# Patient Record
Sex: MALE | Race: WHITE | NOT HISPANIC OR LATINO | Employment: OTHER | URBAN - METROPOLITAN AREA
[De-identification: names, ages, dates, MRNs, and addresses within clinical notes are randomized per-mention and may not be internally consistent; named-entity substitution may affect disease eponyms.]

---

## 2017-11-27 ENCOUNTER — OFFICE VISIT (OUTPATIENT)
Dept: URGENT CARE | Facility: PHYSICIAN GROUP | Age: 82
End: 2017-11-27
Payer: MEDICARE

## 2017-11-27 ENCOUNTER — HOSPITAL ENCOUNTER (OUTPATIENT)
Dept: RADIOLOGY | Facility: MEDICAL CENTER | Age: 82
End: 2017-11-27
Attending: PHYSICIAN ASSISTANT
Payer: MEDICARE

## 2017-11-27 VITALS
WEIGHT: 165 LBS | OXYGEN SATURATION: 97 % | BODY MASS INDEX: 21.77 KG/M2 | RESPIRATION RATE: 16 BRPM | DIASTOLIC BLOOD PRESSURE: 80 MMHG | HEART RATE: 80 BPM | TEMPERATURE: 99.2 F | SYSTOLIC BLOOD PRESSURE: 122 MMHG
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
## 2017-11-27 DIAGNOSIS — S82.842A BIMALLEOLAR FRACTURE OF LEFT ANKLE, CLOSED, INITIAL ENCOUNTER: Primary | ICD-10-CM

## 2017-11-27 DIAGNOSIS — S99.912A INJURY OF LEFT ANKLE, INITIAL ENCOUNTER: ICD-10-CM

## 2017-11-27 DIAGNOSIS — L03.039 INFECTION OF TOENAIL: ICD-10-CM

## 2017-11-27 PROCEDURE — 99204 OFFICE O/P NEW MOD 45 MIN: CPT | Performed by: PHYSICIAN ASSISTANT

## 2017-11-27 PROCEDURE — 73610 X-RAY EXAM OF ANKLE: CPT | Mod: LT

## 2017-11-27 RX ORDER — SULFAMETHOXAZOLE AND TRIMETHOPRIM 800; 160 MG/1; MG/1
1 TABLET ORAL 2 TIMES DAILY
Qty: 20 TAB | Refills: 0 | Status: ON HOLD | OUTPATIENT
Start: 2017-11-27 | End: 2017-12-16

## 2017-11-27 RX ORDER — ACLIDINIUM BROMIDE 400 UG/1
1 POWDER, METERED RESPIRATORY (INHALATION) 2 TIMES DAILY
Status: ON HOLD | COMMUNITY
Start: 2017-11-01 | End: 2017-12-16

## 2017-11-27 ASSESSMENT — ENCOUNTER SYMPTOMS
LOSS OF MOTION: 1
SENSORY CHANGE: 0
FOCAL WEAKNESS: 0
TINGLING: 0
INABILITY TO BEAR WEIGHT: 1

## 2017-11-27 NOTE — PATIENT INSTRUCTIONS
Nondisplaced Bimalleolar Ankle Fracture Treated With Immobilization  A bimalleolar fracture is two breaks (fractures) in the lower bones of the leg that help to make up the ankle joint. These fractures are in the end of the bone that you feel as the bump on the outside of the ankle (fibula) and the bone that you feel as the bump on the inside of the ankle (tibia).  If your fracture is nondisplaced, that means that the joint is stable and the bones are still in their normal position. It is important that the bones continue to stay in the correct position (stabilized) while your ankle is healing. This stabilizing is done with immobilization, which uses a cast or splint to hold your ankle in place.  CAUSES  Common causes of this type of fracture include:  · Car accidents.  · Jumps or falls from a significant height.  · Injuries from high-energy sports or contact sports.  · Quickly or severely twisting your ankle.  RISK FACTORS  You may be at higher risk for this type of fracture if:   · You play high-energy sports or contact sports.  · You are an older person who has a condition that causes weak bones (osteoporosis).  SYMPTOMS  Symptoms of a nondisplaced bimalleolar ankle fracture begin immediately after the injury. They may include:  · Pain that is worse when you use your ankle to support your body weight.  · Swelling around your ankle.  · Bruising around your ankle.  DIAGNOSIS  This injury is diagnosed based on your symptoms, especially if you have had a recent injury. Your health care provider will also do a physical exam. You may also have imaging tests, such as:  · An X-ray of your ankle. This is used to confirm the diagnosis.  · A CT scan or MRI. This checks to make sure that no bones have moved out of place and that there are no injuries to the strong bands of tissue that connect bones (ligaments) in your ankle.  TREATMENT  Nondisplaced bimalleolar ankle fracture treated with immobilization involves wearing a  cast or splint to keep your leg in one position while it heals. You may also need to use crutches, a scooter, a walker, or a wheelchair so you can move around without using your injured leg to support your body weight. You may also be prescribed pain medicine.  While your ankle is healing, you may go to a physical therapist. After your ankle has healed, you may need to continue your physical therapy. Your health care provider and therapist may show you how to do range-of-motion exercises to strengthen your ankle and prevent stiffness.  HOME CARE INSTRUCTIONS  If You Have a Cast:  · Do not stick anything inside the cast to scratch your skin. Doing that increases your risk of infection.  · Check the skin around the cast every day. Report any concerns to your health care provider. You may put lotion on dry skin around the edges of the cast. Do not apply lotion to the skin underneath the cast.  If You Have a Splint:   · Wear it as directed by your health care provider. Remove it only as directed by your health care provider.  · Loosen the splint if your toes become numb and tingle, or if they turn cold and blue.  Bathing   · Cover the cast or splint with a watertight plastic bag to protect it from water while you bathe or shower. Do not let the cast or splint get wet unless your cast or splint is waterproof.  Managing Pain, Stiffness, and Swelling   · If directed, apply ice to the injured area:  ¨ Put ice in a plastic bag.  ¨ Place a towel between your skin and the bag.  ¨ Leave the ice on for 20 minutes, 2-3 times a day.  · Move your toes and ankle often to avoid stiffness and to lessen swelling.  · Raise the injured area above the level of your heart while you are sitting or lying down.  Driving   · Do not drive or operate heavy machinery while taking pain medicine.  · Do not drive while wearing a cast or splint on a leg that you use for driving.  Activity   · Return to your normal activities as directed by your  health care provider. Ask your health care provider what activities are safe for you.  · Perform range-of-motion exercises only as directed by your health care provider or therapist.  Safety   · Do not use the injured limb to support your body weight until your health care provider says that you can. Use crutches, a scooter, a walker, or a wheelchair as directed by your health care provider.  General Instructions   · Do not put pressure on any part of the cast or splint until it is fully hardened. This may take several hours.  · Do not use any tobacco products, including cigarettes, chewing tobacco, or electronic cigarettes. Tobacco can delay bone healing. If you need help quitting, ask your health care provider.  · Take medicines only as directed by your health care provider.  · Keep all follow-up visits as directed by your health care provider. This is important.  SEEK MEDICAL CARE IF:  · Your pain is getting worse.  · Your pain medicine is not helping.  SEEK IMMEDIATE MEDICAL CARE IF:  · You have very bad pain in your injured leg.  · You have swelling or redness in your foot that is getting worse.  · You begin to lose feeling in your foot or your toes.  · Your foot or your toes are cold or turn blue.     This information is not intended to replace advice given to you by your health care provider. Make sure you discuss any questions you have with your health care provider.     Document Released: 09/09/2003 Document Revised: 05/03/2016 Document Reviewed: 11/11/2015  [employer] Patient Education ©2016 [employer]

## 2017-11-27 NOTE — PROGRESS NOTES
Subjective:      Jignesh Dumont is a 83 y.o. male who presents with Ankle Injury (hit ankle around midnight )    PMH:  has a past medical history of BPH; Polio; and Rheumatic fever. He also has no past medical history of Angina; Arrhythmia; Arthritis; ASTHMA; Backpain; Bronchitis; Cancer (CMS-HCC); CATARACT; Congestive heart failure (CMS-HCC); COPD; Diabetes; Dialysis; Glaucoma; Heart murmur; Heart valve disease; Hypertension; Indigestion; Jaundice; Myocardial infarct; Other specified symptom associated with female genital organs; Pacemaker; Personal history of venous thrombosis and embolism; Pneumonia; Psychiatric problem; Renal disorder; Seizure (CMS-HCC); Stroke (CMS-HCC); Unspecified disorder of thyroid; Unspecified hemorrhagic conditions; or Unspecified urinary incontinence.  MEDS:   Current Outpatient Prescriptions:   •  TUDORZA PRESSAIR 400 MCG/ACT AEROSOL POWDER, BREATH ACTIVATED, , Disp: , Rfl:   •  FLOMAX PO, Take 0.4 mg by mouth every day. Every other day., Disp: , Rfl:   •  Levalbuterol HCl (XOPENEX INH), Inhale 1-2 Puffs by mouth every four hours as needed. For shortness of breath , Disp: , Rfl:   ALLERGIES:   Allergies   Allergen Reactions   • Aspirin      SURGHX: History reviewed. No pertinent surgical history.  SOCHX:  reports that he has quit smoking. He has never used smokeless tobacco. He reports that he drinks alcohol. He reports that he does not use drugs.  FH: Reviewed with patient/family. Not pertinent to this complaint.          PT co left ankle pain and inability to bear weight after fall last night. PT who normally ambulates with a walker fell while trying to pick something up and hit his ankle or twisted his ankle, he is unsure.  PT has been unable to bear weight since the fall.      PT has history of polio, right leg is affected leg primarily.         Ankle Injury    The incident occurred 12 to 24 hours ago. The incident occurred at home. The injury mechanism was a fall. The pain is present  in the left ankle. The quality of the pain is described as aching and shooting. The pain is at a severity of 6/10. The pain has been constant since onset. Associated symptoms include an inability to bear weight and a loss of motion. Pertinent negatives include no tingling. He reports no foreign bodies present. The symptoms are aggravated by movement, palpation and weight bearing. He has tried ice, elevation and rest for the symptoms. The treatment provided no relief.       Review of Systems   Musculoskeletal: Positive for joint pain.   Neurological: Negative for tingling, sensory change and focal weakness.   All other systems reviewed and are negative.         Objective:     /80   Pulse 80   Temp 37.3 °C (99.2 °F)   Resp 16   Wt 74.8 kg (165 lb)   SpO2 97%   BMI 21.77 kg/m²      Physical Exam   Constitutional: He is oriented to person, place, and time. He appears well-developed and well-nourished. No distress.   HENT:   Head: Normocephalic and atraumatic.   Nose: Nose normal.   Eyes: Conjunctivae and EOM are normal. Pupils are equal, round, and reactive to light.   Neck: Normal range of motion. Neck supple. No JVD present.   Cardiovascular: Normal rate and intact distal pulses.    Pulmonary/Chest: Effort normal.   Abdominal: Soft.   Lymphadenopathy:     He has no cervical adenopathy.   Neurological: He is alert and oriented to person, place, and time.   Skin: Skin is warm and dry. Capillary refill takes less than 2 seconds.   Psychiatric: He has a normal mood and affect.          Xray images viewed and interpreted by me, confirmed by radiology:       Impression       1.  Displaced fractures of the LEFT medial and lateral malleoli.  2.  Widening of anterior mortise suggest ligamentous injury.  3.  Diffuse soft tissue swelling about LEFT ankle.  4.  No dislocation.  5.  Diffuse osteopenia.   Reading Provider Reading Date   Delgado Wallace M.D. Nov 27, 2017   Signing Provider Signing Date Signing Time   Delgado  MELISSA Wallace Nov 27, 2017 11:29 AM          Assessment/Plan:     1. Bimalleolar fracture of left ankle, closed, initial encounter  DX-ANKLE 3+ VIEWS LEFT   2. Fall, initial encounter  DX-ANKLE 3+ VIEWS LEFT   3. Infection of toenail         11:50am - call placed to Dr Fox, on call for Ortho, for consultation.  Waiting call back.   12:00pm- I spoke with Dr Fox, states pt can be splinted with posterior splint and follow up in office tomorrow.      PT placed in posterior ortho glass splint. Distal neuro/vascular intact.     PT should follow up with Ortho Dr Fox tomorrow.       Discussed red flags and reasons to return to UC or ED.  Pt and/or family verbalized understanding of diagnosis and follow up instructions and was given informational handout on diagnosis.  PT discharged.

## 2017-11-30 ENCOUNTER — HOSPITAL ENCOUNTER (OUTPATIENT)
Facility: MEDICAL CENTER | Age: 82
End: 2017-12-05
Attending: EMERGENCY MEDICINE | Admitting: INTERNAL MEDICINE
Payer: MEDICARE

## 2017-11-30 ENCOUNTER — RESOLUTE PROFESSIONAL BILLING HOSPITAL PROF FEE (OUTPATIENT)
Dept: HOSPITALIST | Facility: MEDICAL CENTER | Age: 82
End: 2017-11-30
Payer: MEDICARE

## 2017-11-30 DIAGNOSIS — R62.51 FAILURE TO THRIVE (0-17): ICD-10-CM

## 2017-11-30 DIAGNOSIS — S82.892A CLOSED FRACTURE OF LEFT ANKLE, INITIAL ENCOUNTER: ICD-10-CM

## 2017-11-30 PROBLEM — S82.899A ANKLE FRACTURE: Status: ACTIVE | Noted: 2017-11-30

## 2017-11-30 LAB
ALBUMIN SERPL BCP-MCNC: 3.8 G/DL (ref 3.2–4.9)
ALBUMIN/GLOB SERPL: 1.1 G/DL
ALP SERPL-CCNC: 56 U/L (ref 30–99)
ALT SERPL-CCNC: 28 U/L (ref 2–50)
ANION GAP SERPL CALC-SCNC: 9 MMOL/L (ref 0–11.9)
AST SERPL-CCNC: 24 U/L (ref 12–45)
BASOPHILS # BLD AUTO: 0.6 % (ref 0–1.8)
BASOPHILS # BLD: 0.06 K/UL (ref 0–0.12)
BILIRUB SERPL-MCNC: 0.4 MG/DL (ref 0.1–1.5)
BUN SERPL-MCNC: 28 MG/DL (ref 8–22)
CALCIUM SERPL-MCNC: 9.6 MG/DL (ref 8.5–10.5)
CHLORIDE SERPL-SCNC: 101 MMOL/L (ref 96–112)
CO2 SERPL-SCNC: 25 MMOL/L (ref 20–33)
CREAT SERPL-MCNC: 0.9 MG/DL (ref 0.5–1.4)
EOSINOPHIL # BLD AUTO: 0.16 K/UL (ref 0–0.51)
EOSINOPHIL NFR BLD: 1.5 % (ref 0–6.9)
ERYTHROCYTE [DISTWIDTH] IN BLOOD BY AUTOMATED COUNT: 45.7 FL (ref 35.9–50)
GFR SERPL CREATININE-BSD FRML MDRD: >60 ML/MIN/1.73 M 2
GLOBULIN SER CALC-MCNC: 3.4 G/DL (ref 1.9–3.5)
GLUCOSE SERPL-MCNC: 98 MG/DL (ref 65–99)
HCT VFR BLD AUTO: 46.2 % (ref 42–52)
HGB BLD-MCNC: 15.4 G/DL (ref 14–18)
IMM GRANULOCYTES # BLD AUTO: 0.05 K/UL (ref 0–0.11)
IMM GRANULOCYTES NFR BLD AUTO: 0.5 % (ref 0–0.9)
INR PPP: 1.03 (ref 0.87–1.13)
LYMPHOCYTES # BLD AUTO: 1.93 K/UL (ref 1–4.8)
LYMPHOCYTES NFR BLD: 17.9 % (ref 22–41)
MCH RBC QN AUTO: 30.9 PG (ref 27–33)
MCHC RBC AUTO-ENTMCNC: 33.3 G/DL (ref 33.7–35.3)
MCV RBC AUTO: 92.8 FL (ref 81.4–97.8)
MONOCYTES # BLD AUTO: 1.31 K/UL (ref 0–0.85)
MONOCYTES NFR BLD AUTO: 12.2 % (ref 0–13.4)
NEUTROPHILS # BLD AUTO: 7.27 K/UL (ref 1.82–7.42)
NEUTROPHILS NFR BLD: 67.3 % (ref 44–72)
NRBC # BLD AUTO: 0 K/UL
NRBC BLD AUTO-RTO: 0 /100 WBC
PLATELET # BLD AUTO: 333 K/UL (ref 164–446)
PMV BLD AUTO: 9.8 FL (ref 9–12.9)
POTASSIUM SERPL-SCNC: 4.7 MMOL/L (ref 3.6–5.5)
PROT SERPL-MCNC: 7.2 G/DL (ref 6–8.2)
PROTHROMBIN TIME: 13.2 SEC (ref 12–14.6)
RBC # BLD AUTO: 4.98 M/UL (ref 4.7–6.1)
SODIUM SERPL-SCNC: 135 MMOL/L (ref 135–145)
WBC # BLD AUTO: 10.8 K/UL (ref 4.8–10.8)

## 2017-11-30 PROCEDURE — 770006 HCHG ROOM/CARE - MED/SURG/GYN SEMI*

## 2017-11-30 PROCEDURE — 99285 EMERGENCY DEPT VISIT HI MDM: CPT

## 2017-11-30 PROCEDURE — G0378 HOSPITAL OBSERVATION PER HR: HCPCS

## 2017-11-30 PROCEDURE — 80053 COMPREHEN METABOLIC PANEL: CPT

## 2017-11-30 PROCEDURE — 85610 PROTHROMBIN TIME: CPT

## 2017-11-30 PROCEDURE — 85025 COMPLETE CBC W/AUTO DIFF WBC: CPT

## 2017-11-30 RX ORDER — AMOXICILLIN 250 MG
2 CAPSULE ORAL 2 TIMES DAILY
Status: DISCONTINUED | OUTPATIENT
Start: 2017-12-01 | End: 2017-12-05 | Stop reason: HOSPADM

## 2017-11-30 RX ORDER — TAMSULOSIN HYDROCHLORIDE 0.4 MG/1
0.4 CAPSULE ORAL DAILY
COMMUNITY

## 2017-11-30 RX ORDER — BUDESONIDE AND FORMOTEROL FUMARATE DIHYDRATE 160; 4.5 UG/1; UG/1
2 AEROSOL RESPIRATORY (INHALATION)
Status: DISCONTINUED | OUTPATIENT
Start: 2017-12-01 | End: 2017-12-02

## 2017-11-30 RX ORDER — TAMSULOSIN HYDROCHLORIDE 0.4 MG/1
0.4 CAPSULE ORAL EVERY EVENING
Status: DISCONTINUED | OUTPATIENT
Start: 2017-11-30 | End: 2017-12-05 | Stop reason: HOSPADM

## 2017-11-30 RX ORDER — ACETAMINOPHEN 325 MG/1
650 TABLET ORAL EVERY 6 HOURS PRN
Status: DISCONTINUED | OUTPATIENT
Start: 2017-11-30 | End: 2017-12-05 | Stop reason: HOSPADM

## 2017-11-30 RX ORDER — TIOTROPIUM BROMIDE 18 UG/1
1 CAPSULE ORAL; RESPIRATORY (INHALATION)
Status: DISCONTINUED | OUTPATIENT
Start: 2017-12-01 | End: 2017-12-05

## 2017-11-30 RX ORDER — POLYETHYLENE GLYCOL 3350 17 G/17G
1 POWDER, FOR SOLUTION ORAL
Status: DISCONTINUED | OUTPATIENT
Start: 2017-11-30 | End: 2017-12-05 | Stop reason: HOSPADM

## 2017-11-30 RX ORDER — BISACODYL 10 MG
10 SUPPOSITORY, RECTAL RECTAL
Status: DISCONTINUED | OUTPATIENT
Start: 2017-11-30 | End: 2017-12-05 | Stop reason: HOSPADM

## 2017-11-30 ASSESSMENT — PAIN SCALES - GENERAL: PAINLEVEL_OUTOF10: 6

## 2017-11-30 NOTE — LETTER
[facility] ([facility]) - [phone]  EHR eReferral Notification Requirements    To be sent by secure email to [email or web address] or   by fax to [phone]       FIELDS ARE REQUIRED TO BE COMPLETED     Patient Name:  Jignesh Dumont  MRN:  [id number]   Account Number: [id number]                YOB: 1934    Date Roomed in ED:    11/30/2017   9:08 PM  Date First Observation Order Placed:      Date First Inpatient Order Placed: 11/30/2017   10:24 PM    Date of Previous Admission (Needed For Readmission Reviews Only):     Discharge Date and Time (if applicable): No discharge date for patient encounter.     PLEASE CHECK OFF TYPE OF REVIEW & CURRENT ADMISSION STATUS FROM LISTS BELOW  Type of Review:  Admission review    Dates to be Reviewed: 11/30-12/1        Current Admission Status:  Inpatient    / Contact Number for EHR outcome/recommendation call:    Rebekah Gonzalez [phone]     Attending Physician/ Contact Number (if not the same as in electronic record):  Dr Harry [phone]     Comments:  Please review per the Medicare Two Midnight Rule      Additional Information being Emailed or Faxed:  yes       Fax Handwritten Supporting Documents to EHR at [phone]      [address] [address]  [phone]  [email or web address]    Updated December 17, 2014

## 2017-11-30 NOTE — ED NOTES
Patient to ED with complaints of being unable to care for self and request for placement. He has hx of polio with right leg weakness. He broke his left ankle Monday with instructions from ortho to be NWB x6 weeks. He lives alone and cannot get around his home even with a wheelchair. Family attempted to help but are unable to be there as often as he requires. Assisted living declined him. He is asking for assisted for placement in skilled or rehab.

## 2017-12-01 PROBLEM — R62.51 FAILURE TO THRIVE (0-17): Status: ACTIVE | Noted: 2017-12-01

## 2017-12-01 PROCEDURE — G0378 HOSPITAL OBSERVATION PER HR: HCPCS

## 2017-12-01 PROCEDURE — 770006 HCHG ROOM/CARE - MED/SURG/GYN SEMI*

## 2017-12-01 PROCEDURE — G8978 MOBILITY CURRENT STATUS: HCPCS | Mod: CL

## 2017-12-01 PROCEDURE — G8979 MOBILITY GOAL STATUS: HCPCS | Mod: CI

## 2017-12-01 PROCEDURE — G8987 SELF CARE CURRENT STATUS: HCPCS | Mod: CK

## 2017-12-01 PROCEDURE — 700111 HCHG RX REV CODE 636 W/ 250 OVERRIDE (IP): Performed by: STUDENT IN AN ORGANIZED HEALTH CARE EDUCATION/TRAINING PROGRAM

## 2017-12-01 PROCEDURE — 97166 OT EVAL MOD COMPLEX 45 MIN: CPT

## 2017-12-01 PROCEDURE — 700102 HCHG RX REV CODE 250 W/ 637 OVERRIDE(OP): Performed by: STUDENT IN AN ORGANIZED HEALTH CARE EDUCATION/TRAINING PROGRAM

## 2017-12-01 PROCEDURE — 99218 PR INITIAL OBSERVATION CARE,LEVL I: CPT | Mod: GC | Performed by: INTERNAL MEDICINE

## 2017-12-01 PROCEDURE — G8988 SELF CARE GOAL STATUS: HCPCS | Mod: CI

## 2017-12-01 PROCEDURE — 97162 PT EVAL MOD COMPLEX 30 MIN: CPT

## 2017-12-01 PROCEDURE — A9270 NON-COVERED ITEM OR SERVICE: HCPCS | Performed by: STUDENT IN AN ORGANIZED HEALTH CARE EDUCATION/TRAINING PROGRAM

## 2017-12-01 RX ADMIN — TAMSULOSIN HYDROCHLORIDE 0.4 MG: 0.4 CAPSULE ORAL at 23:00

## 2017-12-01 RX ADMIN — VITAMIN D, TAB 1000IU (100/BT) 2000 UNITS: 25 TAB at 09:21

## 2017-12-01 RX ADMIN — STANDARDIZED SENNA CONCENTRATE AND DOCUSATE SODIUM 1 TABLET: 8.6; 5 TABLET, FILM COATED ORAL at 09:20

## 2017-12-01 RX ADMIN — TAMSULOSIN HYDROCHLORIDE 0.4 MG: 0.4 CAPSULE ORAL at 01:04

## 2017-12-01 RX ADMIN — THERA TABS 1 TABLET: TAB at 09:20

## 2017-12-01 RX ADMIN — ENOXAPARIN SODIUM 40 MG: 100 INJECTION SUBCUTANEOUS at 09:20

## 2017-12-01 ASSESSMENT — ENCOUNTER SYMPTOMS
MYALGIAS: 0
WEIGHT LOSS: 0
VOMITING: 0
FEVER: 0
SINUS PAIN: 0
ABDOMINAL PAIN: 0
WEAKNESS: 1
COUGH: 0
CONSTIPATION: 0
CHILLS: 0
PHOTOPHOBIA: 0
NERVOUS/ANXIOUS: 0
BLURRED VISION: 0
HEADACHES: 0
STRIDOR: 0
SHORTNESS OF BREATH: 0
DIARRHEA: 0
DIZZINESS: 0
BACK PAIN: 0
SPUTUM PRODUCTION: 0
PALPITATIONS: 0
FOCAL WEAKNESS: 1
INSOMNIA: 0
NAUSEA: 0
HEMOPTYSIS: 0
DIAPHORESIS: 0

## 2017-12-01 ASSESSMENT — LIFESTYLE VARIABLES
TOTAL SCORE: 0
CONSUMPTION TOTAL: INCOMPLETE
TOTAL SCORE: 0
EVER HAD A DRINK FIRST THING IN THE MORNING TO STEADY YOUR NERVES TO GET RID OF A HANGOVER: NO
HAVE PEOPLE ANNOYED YOU BY CRITICIZING YOUR DRINKING: NO
EVER_SMOKED: YES
HAVE YOU EVER FELT YOU SHOULD CUT DOWN ON YOUR DRINKING: NO
EVER FELT BAD OR GUILTY ABOUT YOUR DRINKING: NO
AVERAGE NUMBER OF DAYS PER WEEK YOU HAVE A DRINK CONTAINING ALCOHOL: 3
TOTAL SCORE: 0
ALCOHOL_USE: YES
ON A TYPICAL DAY WHEN YOU DRINK ALCOHOL HOW MANY DRINKS DO YOU HAVE: 1

## 2017-12-01 ASSESSMENT — COGNITIVE AND FUNCTIONAL STATUS - GENERAL
SUGGESTED CMS G CODE MODIFIER DAILY ACTIVITY: CK
MOVING FROM LYING ON BACK TO SITTING ON SIDE OF FLAT BED: A LOT
DRESSING REGULAR LOWER BODY CLOTHING: A LOT
HELP NEEDED FOR BATHING: A LOT
TOILETING: A LOT
CLIMB 3 TO 5 STEPS WITH RAILING: TOTAL
WALKING IN HOSPITAL ROOM: TOTAL
MOBILITY SCORE: 14
DAILY ACTIVITIY SCORE: 18
STANDING UP FROM CHAIR USING ARMS: A LOT
SUGGESTED CMS G CODE MODIFIER MOBILITY: CL

## 2017-12-01 ASSESSMENT — COPD QUESTIONNAIRES
DURING THE PAST 4 WEEKS HOW MUCH DID YOU FEEL SHORT OF BREATH: NONE/LITTLE OF THE TIME
HAVE YOU SMOKED AT LEAST 100 CIGARETTES IN YOUR ENTIRE LIFE: NO/DON'T KNOW
DO YOU EVER COUGH UP ANY MUCUS OR PHLEGM?: NO/ONLY WITH OCCASIONAL COLDS OR INFECTIONS
COPD SCREENING SCORE: 2

## 2017-12-01 ASSESSMENT — PATIENT HEALTH QUESTIONNAIRE - PHQ9
1. LITTLE INTEREST OR PLEASURE IN DOING THINGS: NOT AT ALL
SUM OF ALL RESPONSES TO PHQ QUESTIONS 1-9: 0
SUM OF ALL RESPONSES TO PHQ9 QUESTIONS 1 AND 2: 0
2. FEELING DOWN, DEPRESSED, IRRITABLE, OR HOPELESS: NOT AT ALL

## 2017-12-01 ASSESSMENT — PAIN SCALES - GENERAL
PAINLEVEL_OUTOF10: 0

## 2017-12-01 ASSESSMENT — GAIT ASSESSMENTS: GAIT LEVEL OF ASSIST: UNABLE TO PARTICIPATE

## 2017-12-01 ASSESSMENT — ACTIVITIES OF DAILY LIVING (ADL): TOILETING: INDEPENDENT

## 2017-12-01 NOTE — PROGRESS NOTES
.         Internal Medicine Interval Note  Note Author: Lavon Harry M.D.     Name Jignesh Dumont       1934   Age/Sex 83 y.o. male   MRN [id number]   Code Status FULL     After 5PM or if no immediate response to page, please call for cross-coverage  Attending/Team: Ronda/Shon See Patient List for primary contact information  Call [phone] to page    1st Call - Day Intern (R1):   Kamryn 2nd Call - Day Sr. Resident (R2/R3):   William         Reason for interval visit  (Principal Problem)   Failure to thrive    Interval Problem Daily Status Update  (24 hours)     Patient has no complaints at this time. Reports he has baseline R leg weakness due to polio. Was told he needs to be non-weight bearing on L leg for 6 weeks due to recent ankle fracture. States he is unable to take care of himself due to his immobility. Will have PT/OT evaluate. SW to assist with disposition.    Review of Systems   Constitutional: Negative for chills, diaphoresis and fever.   Respiratory: Negative for cough, hemoptysis and shortness of breath.    Cardiovascular: Negative for chest pain, palpitations and leg swelling.   Gastrointestinal: Negative for abdominal pain, constipation, diarrhea, nausea and vomiting.   Genitourinary: Negative for dysuria.   Musculoskeletal: Negative for myalgias.   Neurological: Negative for dizziness and headaches.       Consultants/Specialty  none    Disposition  Inpatient    Quality Measures    Reviewed items::  Labs reviewed and Medications reviewed  Bedolla catheter::  No Bedolla  DVT prophylaxis pharmacological::  Enoxaparin (Lovenox)  Ulcer Prophylaxis::  Not indicated          Physical Exam       Vitals:    17 0130 17 0228 17 0510 17 0800   BP:  124/58 112/61 100/82   Pulse: 70 66 77 87   Resp:  18 16 20   Temp:  36.9 °C (98.4 °F) 36.5 °C (97.7 °F) 37 °C (98.6 °F)   SpO2: 91% 93% 93% 92%   Weight:       Height:         Body mass index is 19.37 kg/m².    Oxygen Therapy:  Pulse  Oximetry: 92 %, O2 (LPM): 0, O2 Delivery: None (Room Air)    Physical Exam   Constitutional: He is oriented to person, place, and time and well-developed, well-nourished, and in no distress.   Eyes: Conjunctivae and EOM are normal. Pupils are equal, round, and reactive to light.   Cardiovascular: Normal rate, regular rhythm and normal heart sounds.  Exam reveals no gallop and no friction rub.    No murmur heard.  Pulmonary/Chest: Effort normal and breath sounds normal. No respiratory distress. He has no wheezes. He has no rales.   Abdominal: Soft. Bowel sounds are normal. He exhibits no distension. There is no tenderness. There is no guarding.   Large soft abdomen   Musculoskeletal: He exhibits no edema or tenderness.   L leg cast, palpable dorsalis pedis pulse, overgrown nails with yellow discoloration; R leg normal appearance, no atrophy   Neurological: He is alert and oriented to person, place, and time.   R leg strength 5/5, sensation intact bilateral legs to light touch   Skin: Skin is warm and dry.   Psychiatric: Affect and judgment normal.         Lab Data Review:         12/1/2017  3:23 PM    Recent Labs      11/30/17 2128   SODIUM  135   POTASSIUM  4.7   CHLORIDE  101   CO2  25   BUN  28*   CREATININE  0.90   CALCIUM  9.6       Recent Labs      11/30/17 2128   ALTSGPT  28   ASTSGOT  24   ALKPHOSPHAT  56   TBILIRUBIN  0.4   GLUCOSE  98       Recent Labs      11/30/17 2128   RBC  4.98   HEMOGLOBIN  15.4   HEMATOCRIT  46.2   PLATELETCT  333   PROTHROMBTM  13.2   INR  1.03       Recent Labs      11/30/17 2128   WBC  10.8   NEUTSPOLYS  67.30   LYMPHOCYTES  17.90*   MONOCYTES  12.20   EOSINOPHILS  1.50   BASOPHILS  0.60   ASTSGOT  24   ALTSGPT  28   ALKPHOSPHAT  56   TBILIRUBIN  0.4           Assessment/Plan     Ankle fracture   Assessment & Plan    Bimalleolar fracture left ankle in fibre glass cast.  Unable to mobilize and take care of himself at home due to L leg cast and R leg weakness  PT/OT  evaluation  Patient awaiting SNF/Rehab placement          Failure to thrive (0-17)   Assessment & Plan    -patient needs a centre capable of giving him higher levels of care as he lives alone.  -Awaiting SNF/Rehab

## 2017-12-01 NOTE — H&P
Internal Medicine Admitting History and Physical    Note Author: Danilo Pereira M.D.       Name Jignesh Dumont       1934   Age/Sex 83 y.o. male   MRN [id number]   Code Status FULL     After 5PM or if no immediate response to page, please call for cross-coverage  Attending/Team: /carito See Patient List for primary contact information  Call [phone] to page    1st Call - Day Intern (R1):   DR.ABHINAYA SAUNDERS     2nd Call - Day Sr. Resident (R2/R3):          Chief Complaint:  H/o fall with trauma to the left ankle and failure to thrive.  HPI:Mr Jignesh Dumont has a medical history of BPH; Polio; and Rheumatic fever.   Patient is a 83 year old  Male who presented to the ED after family was concerned that the patient is alone at home and is unable to take care of himself at home  after he sustained a fall on the 17 and sustained trauma to the left ankle and was immobilised in a fibreglass cast for a fracture  Sustained to his left ankle secondary to a fall which he sustained on the 17 while trying to lift something.patient was seen by orthopaedics who diagnosed a closed Bimalleolar fracture of the left ankle and he was immobilis ed in cast  Patient also has bad infections to his toenail bases for which he was given antibiotics and then sent home for follow ups.  Patient denies any pain or discomfort to the left ankle within the glass cast and is able to move his toes without any discomfort.  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    Review of Systems   Constitutional: Negative for malaise/fatigue and weight loss.   HENT: Negative for congestion and sinus pain.    Eyes: Negative for blurred vision and photophobia.   Respiratory: Negative for cough, sputum production, shortness of breath and stridor.    Cardiovascular: Negative for chest pain  and palpitations.   Gastrointestinal: Negative for abdominal pain, nausea and vomiting.   Genitourinary: Negative for dysuria and frequency.   Musculoskeletal: Negative for back pain and myalgias.   Skin: Negative for itching and rash.   Neurological: Positive for focal weakness and weakness. Negative for headaches.   Psychiatric/Behavioral: The patient is not nervous/anxious and does not have insomnia.              Past Medical History:   Past Medical History:   Diagnosis Date   • BPH    • COPD (chronic obstructive pulmonary disease) (CMS-HCC)    • Polio    • Rheumatic fever        Past Surgical History:  History reviewed. No pertinent surgical history.    Current Outpatient Medications:  Home Medications     Reviewed by Josias Scott (Pharmacy Tech) on 11/30/17 at [facility]  Med List Status: Complete   Medication Last Dose Status   Cholecalciferol (VITAMIN D3) 1000 units Cap 11/30/2017 Active   fluticasone-salmeterol (ADVAIR) 250-50 MCG/DOSE AEROSOL POWDER, BREATH ACTIVATED 11/30/2017 Active   multivitamin (THERAGRAN) Tab 11/30/2017 Active   sulfamethoxazole-trimethoprim (BACTRIM DS) 800-160 MG tablet 11/30/2017 Active   tamsulosin (FLOMAX) 0.4 MG capsule 11/29/2017 Active   TUDORZA PRESSAIR 400 MCG/ACT AEROSOL POWDER, BREATH ACTIVATED 11/30/2017 Active                Medication Allergy/Sensitivities:  Allergies   Allergen Reactions   • Aspirin Shortness of Breath         Family History:  History reviewed. No pertinent family history.    Social History:  Social History     Social History   • Marital status: Single     Spouse name: N/A   • Number of children: N/A   • Years of education: N/A     Occupational History   • Not on file.     Social History Main Topics   • Smoking status: Former Smoker   • Smokeless tobacco: Never Used      Comment: 20 years ago   • Alcohol use Yes      Comment: occ   • Drug use: No   • Sexual activity: Not on file     Other Topics Concern   • Not on file     Social History Narrative  "  • No narrative on file     Living situation: Lives alone in [facility]      Physical Exam     Vitals:    12/01/17 0100 12/01/17 0130 12/01/17 0228 12/01/17 0510   BP:   124/58 112/61   Pulse: 74 70 66 77   Resp:   18 16   Temp:   36.9 °C (98.4 °F) 36.5 °C (97.7 °F)   SpO2: 93% 91% 93% 93%   Weight:       Height:         Body mass index is 19.37 kg/m².  /61   Pulse 77   Temp 36.5 °C (97.7 °F)   Resp 16   Ht 1.778 m (5' 10\")   Wt 61.2 kg (135 lb)   SpO2 93%   BMI 19.37 kg/m²   O2 therapy: Pulse Oximetry: 93 %, O2 (LPM): 0, O2 Delivery: None (Room Air)    Physical Exam   Constitutional: He is oriented to person, place, and time and well-developed, well-nourished, and in no distress.   HENT:   Head: Normocephalic and atraumatic.   Eyes: EOM are normal. Pupils are equal, round, and reactive to light.   Neck: Normal range of motion.   Pulmonary/Chest: Effort normal and breath sounds normal. No respiratory distress. He has no wheezes.   Abdominal: Soft. Bowel sounds are normal. He exhibits distension.   Possible abdominal  Hernia   Musculoskeletal: Normal range of motion.   LT ANKLE IMMOBILISED IN FIBREGLASS CAST.  NO CYANOSIS ON THE TIPS OF THE TOES.   Neurological: He is alert and oriented to person, place, and time. No cranial nerve deficit.   Skin: Skin is warm and dry.   Psychiatric: Affect normal.             Data Review       Old Records Request:   Completed  Current Records review and summary: Completed    Lab Data Review:  Recent Results (from the past 24 hour(s))   CBC WITH DIFFERENTIAL    Collection Time: 11/30/17  9:28 PM   Result Value Ref Range    WBC 10.8 4.8 - 10.8 K/uL    RBC 4.98 4.70 - 6.10 M/uL    Hemoglobin 15.4 14.0 - 18.0 g/dL    Hematocrit 46.2 42.0 - 52.0 %    MCV 92.8 81.4 - 97.8 fL    MCH 30.9 27.0 - 33.0 pg    MCHC 33.3 (L) 33.7 - 35.3 g/dL    RDW 45.7 35.9 - 50.0 fL    Platelet Count 333 164 - 446 K/uL    MPV 9.8 9.0 - 12.9 fL    Neutrophils-Polys 67.30 44.00 - " 72.00 %    Lymphocytes 17.90 (L) 22.00 - 41.00 %    Monocytes 12.20 0.00 - 13.40 %    Eosinophils 1.50 0.00 - 6.90 %    Basophils 0.60 0.00 - 1.80 %    Immature Granulocytes 0.50 0.00 - 0.90 %    Nucleated RBC 0.00 /100 WBC    Neutrophils (Absolute) 7.27 1.82 - 7.42 K/uL    Lymphs (Absolute) 1.93 1.00 - 4.80 K/uL    Monos (Absolute) 1.31 (H) 0.00 - 0.85 K/uL    Eos (Absolute) 0.16 0.00 - 0.51 K/uL    Baso (Absolute) 0.06 0.00 - 0.12 K/uL    Immature Granulocytes (abs) 0.05 0.00 - 0.11 K/uL    NRBC (Absolute) 0.00 K/uL   COMP METABOLIC PANEL    Collection Time: 11/30/17  9:28 PM   Result Value Ref Range    Sodium 135 135 - 145 mmol/L    Potassium 4.7 3.6 - 5.5 mmol/L    Chloride 101 96 - 112 mmol/L    Co2 25 20 - 33 mmol/L    Anion Gap 9.0 0.0 - 11.9    Glucose 98 65 - 99 mg/dL    Bun 28 (H) 8 - 22 mg/dL    Creatinine 0.90 0.50 - 1.40 mg/dL    Calcium 9.6 8.5 - 10.5 mg/dL    AST(SGOT) 24 12 - 45 U/L    ALT(SGPT) 28 2 - 50 U/L    Alkaline Phosphatase 56 30 - 99 U/L    Total Bilirubin 0.4 0.1 - 1.5 mg/dL    Albumin 3.8 3.2 - 4.9 g/dL    Total Protein 7.2 6.0 - 8.2 g/dL    Globulin 3.4 1.9 - 3.5 g/dL    A-G Ratio 1.1 g/dL   PROTHROMBIN TIME (INR)    Collection Time: 11/30/17  9:28 PM   Result Value Ref Range    PT 13.2 12.0 - 14.6 sec    INR 1.03 0.87 - 1.13   ESTIMATED GFR    Collection Time: 11/30/17  9:28 PM   Result Value Ref Range    GFR If African American >60 >60 mL/min/1.73 m 2    GFR If Non African American >60 >60 mL/min/1.73 m 2       Imaging/Procedures Review:    ndependant Imaging Review: Completed  No orders to display          EKG:   EKG Independant Review: Completed             Assessment/Plan     Failure to thrive (0-17)   Assessment & Plan    -patient needs a centre capable of giving him higher levels of care as he lives alone.  -Awaiting SNF/Rehab        Ankle fracture   Assessment & Plan    Bimalleolar fracture left ankle in fibre glass cast.  No evidence of compartment syndrome seen.  For PT/OT to  help in walker ambulation as Rt leg weak sec to post Polio syndrome.  Podiatry consult for removal of toenails.  -Patient awaiting SNF/Rehab placement              Anticipated Hospital stay:  >2 midnights        Quality Measures    Reviewed items::  Medications reviewed  DVT prophylaxis pharmacological::  Enoxaparin (Lovenox)

## 2017-12-01 NOTE — PROGRESS NOTES
TWO RN SKIN CHECK COMPLETE     Minor redness with blanching on lower right extremity     Patient has a small scab located posterior . Area of approximately t6/t7     Otherwise skin intact over all bony prominence

## 2017-12-01 NOTE — ED NOTES
Pt assisted to blue 21, pt has cast on right ankle, states he has hx of polio and can not bear weight

## 2017-12-01 NOTE — ED PROVIDER NOTES
ED Provider Note    Scribed for Goran Yi M.D. by Nayeli Palomino. 11/30/2017, 9:34 PM.    Primary care provider: Andrey Villanueva M.D.  Means of arrival: Wheel Chair  History obtained from: Patient  History limited by: None    CHIEF COMPLAINT  Chief Complaint   Patient presents with   • Failure to Thrive   • Ankle Injury       HPI  Jignesh Dumont is a 83 y.o. male who presents to the Emergency Department for evaluation of 2 broken bones to his left ankle s/p an ankle injury onset Monday and failure to thrive. Patient confirms a past medical history of polio leaving him unable to bear weight on his right leg and now presents with a cast to his left leg since Tuesday with instructions from his orthopedist to remain non weight bearing for 6 weeks. Family members, who he is assisted by, report being unable to properly care for him and are concerned since he lives alone. Patient has tried to get in to assisted living but was declined as they are unable to meet his full needs. Per patient, he does have a walker at home which he uses to ambulate but not without help. Denies any other complaints.    REVIEW OF SYSTEMS - C  Patient denies fever, vision change, sore throat, chest pain, shortness of breath, nausea, dysuria, focal muscle pain, rashes, or neurologic deficits. Positive for ankle injury, failure to thrive.    PAST MEDICAL HISTORY   has a past medical history of BPH; Polio; and Rheumatic fever.    SURGICAL HISTORY  patient denies any surgical history    SOCIAL HISTORY  Social History   Substance Use Topics   • Smoking status: Former Smoker   • Smokeless tobacco: Never Used      Comment: 20 years ago   • Alcohol use Yes      Comment: occ      History   Drug Use No       FAMILY HISTORY  No family history on file.    CURRENT MEDICATIONS  Reviewed.  See Encounter Summary.     ALLERGIES  Allergies   Allergen Reactions   • Aspirin Shortness of Breath       PHYSICAL EXAM  VITAL SIGNS: /54   Pulse 78    "Temp 37 °C (98.6 °F)   Resp 20   Ht 1.778 m (5' 10\")   Wt 61.2 kg (135 lb)   SpO2 99%   BMI 19.37 kg/m²      Pulse ox interpretation: I interpret this pulse ox as normal.  Constitutional: Cachectic. Elderly and frail appearing. Alert in no apparent distress.  HENT: Head normocephalic, atraumatic, Bilateral external ears normal, Nose normal.  Eyes: Pupils are equal, extraocular movements intact, Conjunctiva normal, Non-icteric.   Neck: Normal range of motion, Supple, No stridor.   Cardiovascular: Regular rate and rhythm   Thorax & Lungs: No acute respiratory distress, No wheezing, No increased work of breathing.   Abdomen: Soft, Non tender, Non-distended, No pulsatile masses, No peritoneal signs.  Skin: Warm, Dry, No erythema, No rash.   Extremities: Right leg has muscular atrophy. Left leg is in cast with pink and well perfused lower extremities. Intact distal pulses, No tenderness, No cyanosis.    Neurologic: Alert , Normal motor function, Normal sensory function, No focal deficits noted.   Psychiatric: Affect normal, Judgment normal, Mood normal.     DIAGNOSTIC STUDIES / PROCEDURES     LABS  Results for orders placed or performed during the hospital encounter of 11/30/17   CBC WITH DIFFERENTIAL   Result Value Ref Range    WBC 10.8 4.8 - 10.8 K/uL    RBC 4.98 4.70 - 6.10 M/uL    Hemoglobin 15.4 14.0 - 18.0 g/dL    Hematocrit 46.2 42.0 - 52.0 %    MCV 92.8 81.4 - 97.8 fL    MCH 30.9 27.0 - 33.0 pg    MCHC 33.3 (L) 33.7 - 35.3 g/dL    RDW 45.7 35.9 - 50.0 fL    Platelet Count 333 164 - 446 K/uL    MPV 9.8 9.0 - 12.9 fL    Neutrophils-Polys 67.30 44.00 - 72.00 %    Lymphocytes 17.90 (L) 22.00 - 41.00 %    Monocytes 12.20 0.00 - 13.40 %    Eosinophils 1.50 0.00 - 6.90 %    Basophils 0.60 0.00 - 1.80 %    Immature Granulocytes 0.50 0.00 - 0.90 %    Nucleated RBC 0.00 /100 WBC    Neutrophils (Absolute) 7.27 1.82 - 7.42 K/uL    Lymphs (Absolute) 1.93 1.00 - 4.80 K/uL    Monos (Absolute) 1.31 (H) 0.00 - 0.85 K/uL    " Eos (Absolute) 0.16 0.00 - 0.51 K/uL    Baso (Absolute) 0.06 0.00 - 0.12 K/uL    Immature Granulocytes (abs) 0.05 0.00 - 0.11 K/uL    NRBC (Absolute) 0.00 K/uL   COMP METABOLIC PANEL   Result Value Ref Range    Sodium 135 135 - 145 mmol/L    Potassium 4.7 3.6 - 5.5 mmol/L    Chloride 101 96 - 112 mmol/L    Co2 25 20 - 33 mmol/L    Anion Gap 9.0 0.0 - 11.9    Glucose 98 65 - 99 mg/dL    Bun 28 (H) 8 - 22 mg/dL    Creatinine 0.90 0.50 - 1.40 mg/dL    Calcium 9.6 8.5 - 10.5 mg/dL    AST(SGOT) 24 12 - 45 U/L    ALT(SGPT) 28 2 - 50 U/L    Alkaline Phosphatase 56 30 - 99 U/L    Total Bilirubin 0.4 0.1 - 1.5 mg/dL    Albumin 3.8 3.2 - 4.9 g/dL    Total Protein 7.2 6.0 - 8.2 g/dL    Globulin 3.4 1.9 - 3.5 g/dL    A-G Ratio 1.1 g/dL   PROTHROMBIN TIME (INR)   Result Value Ref Range    PT 13.2 12.0 - 14.6 sec    INR 1.03 0.87 - 1.13   ESTIMATED GFR   Result Value Ref Range    GFR If African American >60 >60 mL/min/1.73 m 2    GFR If Non African American >60 >60 mL/min/1.73 m 2     All labs were reviewed by me.    COURSE & MEDICAL DECISION MAKING  Pertinent Labs & Imaging studies reviewed. (See chart for details)    9:39 PM - Patient seen and examined at bedside. I recommended a skilled nursing facility to the patient and will contact case management to discuss this. Ordered CBC with differential, CMP, PT/INR, estimated GFR to evaluate his symptoms.     9:45 PM - Paged and consulted with case management.    9:54 PM - Paged Dr. Srinivasan ([facility]) to discuss patient's case and admit.    10:02 PM - Consulted with Dr. Srinivasan ([facility]) who will admit.    Decision Making:  This is a 83 y.o. year old male who presents withInability to ambulate, and living alone.  The patient had a recent foot fracture, and only one good leg.  He has to be nonweightbearing on his left leg, and can't really walk on his right leg.  He has to have full assistance for transferring, and family members have been able to keep up with the patient's needs.   He'll be admitted to hospitalist service.  Given the fact that the patient can't be readily taken to a skilled nursing facility at this time of the evening.    DISPOSITION:  Patient will be admitted to Dr. Srinivasan ([facility] ) in guarded condition.      FINAL IMPRESSION  1.  Failure to thrive.  2.  Nonweightbearing the bilateral lower extremities      Nayeli VU (Scribe), am scribing for, and in the presence of, Goran Yi M.D..    Electronically signed by: Nayeli Palomino (Scribe), 11/30/2017    Goran VU M.D. personally performed the services described in this documentation, as scribed by Nayeli Palomino in my presence, and it is both accurate and complete.    The note accurately reflects work and decisions made by me.  Goran Yi  12/1/2017  1:31 AM

## 2017-12-01 NOTE — ASSESSMENT & PLAN NOTE
- patient needs a centre capable of giving him higher levels of care as he lives alone  - to [facility]

## 2017-12-01 NOTE — ED NOTES
Med rec complete per pt at bedside with RX bottles  Bottles reviewed and returned to pt  Pt has two old RX bottles, one which he puts his vitamins in and the other with his Flomax in it  Pt knows his medications and just uses the old bottles  Pt also has new RX for Bactrim started on 11/27/17, 10 day course

## 2017-12-01 NOTE — PROGRESS NOTES
Assumed care at 150am    Patient AXO 4   No complaints of pain at this time      Resting comfortably , asked to be allowed to sleep     Primary purpose of hospitalization is referal to skilled nursing as he is not able to care for self post ankle fracture     Non weight bearing       Patient has a hernia as abdominal observation in rounded and enlarged

## 2017-12-01 NOTE — SENIOR ADMIT NOTE
HPI:  Patient is a pleasant 83-year-old man who is brought in by his family due to not being able to care for him. He broke his left ankle a few days ago and is now in a cast (no head trauma). He used to walk with a walker, but is now unable to. He has a history of polio when he was a teenager, that affected mostly his right side. He initially had right-sided loss of function that slowly improved, but when he turned 75 years old he began to lose his strength again. He was told of this is called post polio syndrome.   His family looked into several group homes, but was told that they were unable to provide the care their father needed, and that he needed a skilled nursing facility - which is why they brought the patient here.    Past medical history: BPH, teenager and rheumatic fever.    On exam: Laying comfortable in bed, healthy appearing. Pleasant and cooperative.  Cardiac: Regular rhythm, no murmurs rubs or gallops auscultated. No lower extremity edema.  Pulmonary: Good respiratory effort, clear to auscultation bilaterally, no wheezes or crackles.  MSK: all toenails are long, thickened and yellow.   Neuro: Alert and oriented ×4. No sensory or focal deficits found. No muscle atrophy in her extremities.     Assessment and plan:    Failure to thrive  -Needing higher level of care then what the family can provide or what a group home can give him.   -Non-weight bearing status    Plan:   Admit to medical floor  PT/OT - SNF referral  Consult podiatry

## 2017-12-01 NOTE — ASSESSMENT & PLAN NOTE
- Bimalleolar fracture left ankle in fibre glass cast.  - Unable to mobilize and take care of himself at home due to L leg cast and R leg weakness  - PT/OT evaluation with recommendations for SNF  - No pain at this time  - L. Ankle NWB until 01/08/2018 (total 6 weeks)  - Recommend Orthopedics follow up

## 2017-12-02 PROCEDURE — 700102 HCHG RX REV CODE 250 W/ 637 OVERRIDE(OP): Performed by: STUDENT IN AN ORGANIZED HEALTH CARE EDUCATION/TRAINING PROGRAM

## 2017-12-02 PROCEDURE — 700111 HCHG RX REV CODE 636 W/ 250 OVERRIDE (IP): Performed by: STUDENT IN AN ORGANIZED HEALTH CARE EDUCATION/TRAINING PROGRAM

## 2017-12-02 PROCEDURE — G0378 HOSPITAL OBSERVATION PER HR: HCPCS

## 2017-12-02 PROCEDURE — A9270 NON-COVERED ITEM OR SERVICE: HCPCS | Performed by: STUDENT IN AN ORGANIZED HEALTH CARE EDUCATION/TRAINING PROGRAM

## 2017-12-02 PROCEDURE — 99225 PR SUBSEQUENT OBSERVATION CARE,LEVEL II: CPT | Mod: GC | Performed by: INTERNAL MEDICINE

## 2017-12-02 RX ORDER — BUDESONIDE AND FORMOTEROL FUMARATE DIHYDRATE 160; 4.5 UG/1; UG/1
2 AEROSOL RESPIRATORY (INHALATION) DAILY
Status: DISCONTINUED | OUTPATIENT
Start: 2017-12-03 | End: 2017-12-05

## 2017-12-02 RX ADMIN — THERA TABS 1 TABLET: TAB at 08:51

## 2017-12-02 RX ADMIN — ACETAMINOPHEN 650 MG: 325 TABLET, FILM COATED ORAL at 08:52

## 2017-12-02 RX ADMIN — STANDARDIZED SENNA CONCENTRATE AND DOCUSATE SODIUM 2 TABLET: 8.6; 5 TABLET, FILM COATED ORAL at 08:52

## 2017-12-02 RX ADMIN — STANDARDIZED SENNA CONCENTRATE AND DOCUSATE SODIUM 2 TABLET: 8.6; 5 TABLET, FILM COATED ORAL at 20:12

## 2017-12-02 RX ADMIN — ACETAMINOPHEN 650 MG: 325 TABLET, FILM COATED ORAL at 17:06

## 2017-12-02 RX ADMIN — TAMSULOSIN HYDROCHLORIDE 0.4 MG: 0.4 CAPSULE ORAL at 20:12

## 2017-12-02 RX ADMIN — VITAMIN D, TAB 1000IU (100/BT) 2000 UNITS: 25 TAB at 08:51

## 2017-12-02 RX ADMIN — ENOXAPARIN SODIUM 40 MG: 100 INJECTION SUBCUTANEOUS at 08:52

## 2017-12-02 ASSESSMENT — PAIN SCALES - GENERAL
PAINLEVEL_OUTOF10: 0
PAINLEVEL_OUTOF10: 5
PAINLEVEL_OUTOF10: 4
PAINLEVEL_OUTOF10: 0
PAINLEVEL_OUTOF10: 0
PAINLEVEL_OUTOF10: 1

## 2017-12-02 ASSESSMENT — PATIENT HEALTH QUESTIONNAIRE - PHQ9
1. LITTLE INTEREST OR PLEASURE IN DOING THINGS: NOT AT ALL
SUM OF ALL RESPONSES TO PHQ9 QUESTIONS 1 AND 2: 0
SUM OF ALL RESPONSES TO PHQ QUESTIONS 1-9: 0
2. FEELING DOWN, DEPRESSED, IRRITABLE, OR HOPELESS: NOT AT ALL

## 2017-12-02 NOTE — THERAPY
"Occupational Therapy Evaluation completed.   Functional Status:  Pt is an 82 y/o male, admit after a GLF with resultant L ankle fx. Pt has post polio syndrome with weakness in his R LE. . Pt presents with decreased I with ADLS and functional mobility. Pt requires Supervision for UB self care, Mod/ Max A for LB bathing and dressing , with difficulty maintaining balance for hiking pants. Pt required Max A to complete functional transfer from chair to bed- with difficulty maintaining NWB on L LE. Pt will benefit fromAcute OT services to maximize I and safety prior to D/C.   Plan of Care: Will benefit from Occupational Therapy 5 times per week  Discharge Recommendations:  Equipment: Will Continue to Assess for Equipment Needs. Post-acute therapy Discharge to a transitional care facility for continued skilled therapy services.    See \"Rehab Therapy-Acute\" Patient Summary Report for complete documentation.    "

## 2017-12-02 NOTE — FACE TO FACE
Face to Face Supporting Documentation - Home Health    The encounter with this patient was in whole or in part the primary reason for home health admission.    Date of encounter:   Patient:                    MRN:                       YOB: 2017  Jignesh Dumont  [id number]  9/5/1934     Home health to see patient for:  Skilled Nursing care for assessment, interventions & education, Physical Therapy evaluation and treatment and Occupational therapy evaluation and treatment    Skilled need for:  Comment: debility and left ankle fracture    Skilled nursing interventions to include:      Homebound status evidenced by:  . Leaving home requires a considerable and taxing effort. There is a normal inability to leave the home.    Community Physician to provide follow up care: Andrey Villanueva M.D.     Optional Interventions?      I certify the face to face encounter for this home health care referral meets the CMS requirements and the encounter/clinical assessment with the patient was, in whole, or in part, for the medical condition(s) listed above, which is the primary reason for home health care. Based on my clinical findings: the service(s) are medically necessary, support the need for home health care, and the homebound criteria are met.  I certify that this patient has had a face to face encounter by myself.  Robert Thomas M.D. - NPI: [id number]

## 2017-12-02 NOTE — DISCHARGE SUMMARY
Internal Medicine Discharge Summary  Note Author: Robert Thomas M.D.       Admit Date:  11/30/2017       Discharge Date:   12/2/2017    Service:   [facility] Internal Medicine [facility] Team  Attending Physician(s):   Dr. Ronda MIRANDA     Senior Resident(s):   Dr. William MIRANDA  [place] Resident(s):   Dr. Kamryn MIRANDA      Primary Diagnosis:     Acute fracture of left ankle joint  Failure to thrive       Secondary Diagnoses:                Active Problems:    Ankle fracture POA: Unknown    Failure to thrive (0-17) POA: Unknown  Resolved Problems:    * No resolved hospital problems. *      Hospital Summary (Brief Narrative):            A pleasant 83-year-old man who is brought in by his family due to not being able to care for him. He broke his left ankle a few days ago and is now in a cast (no head trauma). Has right sided weakness of his lower extremity due to poliomyelitis since childhood.  His family looked into several group homes, but was told that they were unable to provide the care their father needed, and that he needed a skilled nursing facility - which is why they brought the patient here.  Admitted here and evaluated by PT and OT. Although, their assessment states that he needs SNF but the patient is not qualified for inpatient status. Discharged with home health and home PT and OT.       Patient /Hospital Summary (Details -- Problem Oriented) :          Ankle fracture       Bimalleolar fracture left ankle in fibre glass cast.  Unable to mobilize and take care of himself at home due to L leg cast and R leg weakness  PT/OT evaluation  No pain at this time            Failure to thrive (0-17)       -patient needs a centre capable of giving him higher levels of care as he lives alone.              Consultants:     none    Procedures:        none    Imaging/ Testing:      See imaging    Discharge Medications:         Medication Reconciliation: Completed       Medication List      CONTINUE taking these medications       Instructions   fluticasone-salmeterol 250-50 MCG/DOSE Aepb  Commonly known as:  ADVAIR   Inhale 1 Puff by mouth every day.  Dose:  1 Puff     multivitamin Tabs   Take 1 Tab by mouth every morning.  Dose:  1 Tab     sulfamethoxazole-trimethoprim 800-160 MG tablet  Commonly known as:  BACTRIM DS   Take 1 Tab by mouth 2 times a day.  Dose:  1 Tab     tamsulosin 0.4 MG capsule  Commonly known as:  FLOMAX   Take 0.4 mg by mouth every evening.  Dose:  0.4 mg     TUDORZA PRESSAIR 400 MCG/ACT Aepb  Generic drug:  Aclidinium Bromide   Inhale 1 Puff by mouth 2 Times a Day.  Dose:  1 Puff     Vitamin D3 1000 units Caps   Take 2,000 Units by mouth every morning.  Dose:  2000 Units            Can use .DISCHARGEMEDSLIST if going to another facility         Disposition:   Home discharge     Diet:   Regular diet    Activity:   As tolerated    Instructions:        The patient was instructed to return to the ER in the event of worsening symptoms. I have counseled the patient on the importance of compliance and the patient has agreed to proceed with all medical recommendations and follow up plan indicated above.   The patient understands that all medications come with benefits and risks. Risks may include permanent injury or death and these risks can be minimized with close reassessment and monitoring.        Primary Care Provider:      Discharge summary faxed to primary care provider:  Deferred  Copy of discharge summary given to the patient: Deferred      Follow up appointment details :      None     Pending Studies:        none    Time spent on discharge day patient visit, preparing discharge paperwork and arranging for patient follow up.    Summary of follow up issues:   None     Discharge Time (Minutes) :    40 min  Hospital Course Type: Observation Stay      Condition on Discharge    ______________________________________________________________________    Interval history/exam for day of discharge:         Vitals:    12/01/17  2004 12/02/17 0433 12/02/17 0753 12/02/17 1144   BP: (!) 117/37 101/53 105/54 110/48   Pulse: 92 79 80 74   Resp: 18 16 15 16   Temp:  36.4 °C (97.5 °F) 36.6 °C (97.9 °F) 37.2 °C (98.9 °F)   SpO2: 91% 91% 91% 90%   Weight:       Height:         Weight/BMI: Body mass index is 19.37 kg/m².  Pulse Oximetry: 90 %, O2 (LPM): 0, O2 Delivery: None (Room Air)    General: not in acute distress  CVS: normal S1 and S2, no murmur or gallop or rub  PULM: clear, HVB, no wheezes or crackles      Most Recent Labs:    Lab Results   Component Value Date/Time    WBC 10.8 11/30/2017 09:28 PM    RBC 4.98 11/30/2017 09:28 PM    HEMOGLOBIN 15.4 11/30/2017 09:28 PM    HEMATOCRIT 46.2 11/30/2017 09:28 PM    MCV 92.8 11/30/2017 09:28 PM    MCH 30.9 11/30/2017 09:28 PM    MCHC 33.3 (L) 11/30/2017 09:28 PM    MPV 9.8 11/30/2017 09:28 PM    NEUTSPOLYS 67.30 11/30/2017 09:28 PM    LYMPHOCYTES 17.90 (L) 11/30/2017 09:28 PM    MONOCYTES 12.20 11/30/2017 09:28 PM    EOSINOPHILS 1.50 11/30/2017 09:28 PM    BASOPHILS 0.60 11/30/2017 09:28 PM      Lab Results   Component Value Date/Time    SODIUM 135 11/30/2017 09:28 PM    POTASSIUM 4.7 11/30/2017 09:28 PM    CHLORIDE 101 11/30/2017 09:28 PM    CO2 25 11/30/2017 09:28 PM    GLUCOSE 98 11/30/2017 09:28 PM    BUN 28 (H) 11/30/2017 09:28 PM    CREATININE 0.90 11/30/2017 09:28 PM      Lab Results   Component Value Date/Time    ALTSGPT 28 11/30/2017 09:28 PM    ASTSGOT 24 11/30/2017 09:28 PM    ALKPHOSPHAT 56 11/30/2017 09:28 PM    TBILIRUBIN 0.4 11/30/2017 09:28 PM    ALBUMIN 3.8 11/30/2017 09:28 PM    GLOBULIN 3.4 11/30/2017 09:28 PM    INR 1.03 11/30/2017 09:28 PM     Lab Results   Component Value Date/Time    PROTHROMBTM 13.2 11/30/2017 09:28 PM    INR 1.03 11/30/2017 09:28 PM

## 2017-12-02 NOTE — THERAPY
"Physical Therapy Evaluation completed.   Bed Mobility:  Supine to Sit: Contact Guard Assist (able to use UE to pivot buttock to EOB)  Transfers: Sit to Stand: Moderate Assist  Gait: Level Of Assist: Unable to Participate with No Equipment Needed       Plan of Care: Will benefit from Physical Therapy 3 times per week  Discharge Recommendations: Equipment: Will Continue to Assess for Equipment Needs. Post-acute therapy Discharge to a transitional care facility for continued skilled therapy services.    See \"Rehab Therapy-Acute\" Patient Summary Report for complete documentation.   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 Pt ill benefit from skilled acute PT services to adress deficits. pt very fearful and initally resistent to therapy but after discussion about purpose of PT and purpose of SNF to improve function to get pt home (I) pt was more agreeable but still fearful of R LE weakness and falling. R LE knee must be blocked at all times during standing or the knee lam after 30-60 seconds of standing. pt left in chair. RN notified of session and mobility.,   "

## 2017-12-02 NOTE — PROGRESS NOTES
Discharge pending for skilled. Patient family at bedside. Left le with cast in place, right le very limited and non weight bearing due to hx of polio.

## 2017-12-03 ENCOUNTER — PATIENT OUTREACH (OUTPATIENT)
Dept: HEALTH INFORMATION MANAGEMENT | Facility: OTHER | Age: 82
End: 2017-12-03

## 2017-12-03 PROCEDURE — G0378 HOSPITAL OBSERVATION PER HR: HCPCS

## 2017-12-03 PROCEDURE — 700102 HCHG RX REV CODE 250 W/ 637 OVERRIDE(OP): Performed by: STUDENT IN AN ORGANIZED HEALTH CARE EDUCATION/TRAINING PROGRAM

## 2017-12-03 PROCEDURE — 700111 HCHG RX REV CODE 636 W/ 250 OVERRIDE (IP): Performed by: STUDENT IN AN ORGANIZED HEALTH CARE EDUCATION/TRAINING PROGRAM

## 2017-12-03 PROCEDURE — A9270 NON-COVERED ITEM OR SERVICE: HCPCS | Performed by: STUDENT IN AN ORGANIZED HEALTH CARE EDUCATION/TRAINING PROGRAM

## 2017-12-03 PROCEDURE — 99224 PR SUBSEQUENT OBSERVATION CARE,LEVEL I: CPT | Mod: GC | Performed by: INTERNAL MEDICINE

## 2017-12-03 RX ADMIN — STANDARDIZED SENNA CONCENTRATE AND DOCUSATE SODIUM 2 TABLET: 8.6; 5 TABLET, FILM COATED ORAL at 08:44

## 2017-12-03 RX ADMIN — ACETAMINOPHEN 650 MG: 325 TABLET, FILM COATED ORAL at 17:53

## 2017-12-03 RX ADMIN — THERA TABS 1 TABLET: TAB at 08:44

## 2017-12-03 RX ADMIN — ENOXAPARIN SODIUM 40 MG: 100 INJECTION SUBCUTANEOUS at 08:44

## 2017-12-03 RX ADMIN — TAMSULOSIN HYDROCHLORIDE 0.4 MG: 0.4 CAPSULE ORAL at 21:25

## 2017-12-03 RX ADMIN — VITAMIN D, TAB 1000IU (100/BT) 2000 UNITS: 25 TAB at 08:44

## 2017-12-03 RX ADMIN — BUDESONIDE AND FORMOTEROL FUMARATE DIHYDRATE 2 PUFF: 160; 4.5 AEROSOL RESPIRATORY (INHALATION) at 08:44

## 2017-12-03 RX ADMIN — ACETAMINOPHEN 650 MG: 325 TABLET, FILM COATED ORAL at 08:44

## 2017-12-03 ASSESSMENT — ENCOUNTER SYMPTOMS
SHORTNESS OF BREATH: 0
VOMITING: 0
NAUSEA: 0
DIZZINESS: 0
MYALGIAS: 0
PALPITATIONS: 0
FEVER: 0
CHILLS: 0
HEADACHES: 0
COUGH: 0
HEMOPTYSIS: 0
DIARRHEA: 0
DIAPHORESIS: 0
ABDOMINAL PAIN: 0
CONSTIPATION: 0

## 2017-12-03 ASSESSMENT — PATIENT HEALTH QUESTIONNAIRE - PHQ9
1. LITTLE INTEREST OR PLEASURE IN DOING THINGS: NOT AT ALL
SUM OF ALL RESPONSES TO PHQ QUESTIONS 1-9: 0
2. FEELING DOWN, DEPRESSED, IRRITABLE, OR HOPELESS: NOT AT ALL
SUM OF ALL RESPONSES TO PHQ9 QUESTIONS 1 AND 2: 0

## 2017-12-03 ASSESSMENT — PAIN SCALES - GENERAL
PAINLEVEL_OUTOF10: 0
PAINLEVEL_OUTOF10: 8

## 2017-12-03 NOTE — PROGRESS NOTES
.         Internal Medicine Interval Note  Note Author: Lavon Harry M.D.     Name Jignesh Dumont       1934   Age/Sex 83 y.o. male   MRN [id number]   Code Status FULL     After 5PM or if no immediate response to page, please call for cross-coverage  Attending/Team: Ronda/Shon See Patient List for primary contact information  Call [phone] to page    1st Call - Day Intern (R1):   Kamryn 2nd Call - Day Sr. Resident (R2/R3):   William         Reason for interval visit  (Principal Problem)   Failure to thrive    Interval Problem Daily Status Update  (24 hours)     Patient medically cleared for discharge yesterday. Patient and patient's daughter did not feel patient would be safe at home alone. Currently on observation status. Will speak with SW regarding discharge.      Review of Systems   Constitutional: Negative for chills, diaphoresis and fever.   Respiratory: Negative for cough, hemoptysis and shortness of breath.    Cardiovascular: Negative for chest pain, palpitations and leg swelling.   Gastrointestinal: Negative for abdominal pain, constipation, diarrhea, nausea and vomiting.   Genitourinary: Negative for dysuria.   Musculoskeletal: Negative for myalgias.   Neurological: Negative for dizziness and headaches.       Consultants/Specialty  none    Disposition  Observation status, discharge home with home health?    Quality Measures    Reviewed items::  Labs reviewed and Medications reviewed  Bedolla catheter::  No Bedolla  DVT prophylaxis pharmacological::  Enoxaparin (Lovenox)  Ulcer Prophylaxis::  Not indicated          Physical Exam       Vitals:    17 1653 17 2007 17 0451 17 0735   BP:  100/52 (!) 97/57 108/65   Pulse:  79 71 71   Resp:  17 16 16   Temp:  36.9 °C (98.4 °F) 36.4 °C (97.5 °F) 36.9 °C (98.5 °F)   SpO2:  90% 90% 90%   Weight: 61.2 kg (135 lb)      Height:         Body mass index is 19.37 kg/m². Weight: 61.2 kg (135 lb)  Oxygen Therapy:  Pulse Oximetry: 90 %, O2 (LPM):  0, O2 Delivery: None (Room Air)    Physical Exam   Constitutional: He is oriented to person, place, and time and well-developed, well-nourished, and in no distress.   Eyes: Conjunctivae and EOM are normal. Pupils are equal, round, and reactive to light.   Cardiovascular: Normal rate, regular rhythm and normal heart sounds.  Exam reveals no gallop and no friction rub.    No murmur heard.  Pulmonary/Chest: Effort normal and breath sounds normal. No respiratory distress. He has no wheezes. He has no rales.   Abdominal: Soft. Bowel sounds are normal. He exhibits no distension. There is no tenderness. There is no guarding.   Large soft abdomen   Musculoskeletal: He exhibits no edema or tenderness.   L leg cast, palpable dorsalis pedis pulse, overgrown nails with yellow discoloration; R leg normal appearance, no atrophy   Neurological: He is alert and oriented to person, place, and time.   Skin: Skin is warm and dry.   Psychiatric: Affect and judgment normal.         Lab Data Review:         12/1/2017  3:23 PM    Recent Labs      11/30/17 2128   SODIUM  135   POTASSIUM  4.7   CHLORIDE  101   CO2  25   BUN  28*   CREATININE  0.90   CALCIUM  9.6       Recent Labs      11/30/17 2128   ALTSGPT  28   ASTSGOT  24   ALKPHOSPHAT  56   TBILIRUBIN  0.4   GLUCOSE  98       Recent Labs      11/30/17 2128   RBC  4.98   HEMOGLOBIN  15.4   HEMATOCRIT  46.2   PLATELETCT  333   PROTHROMBTM  13.2   INR  1.03       Recent Labs      11/30/17 2128   WBC  10.8   NEUTSPOLYS  67.30   LYMPHOCYTES  17.90*   MONOCYTES  12.20   EOSINOPHILS  1.50   BASOPHILS  0.60   ASTSGOT  24   ALTSGPT  28   ALKPHOSPHAT  56   TBILIRUBIN  0.4           Assessment/Plan     Ankle fracture   Assessment & Plan    Bimalleolar fracture left ankle in fibre glass cast.  Unable to mobilize and take care of himself at home due to L leg cast and R leg weakness  PT/OT evaluation and recommended SNF  No pain at this time   per hospital policy, he is not qualified for  inpatient, so to be discharged home with home PT and OT and home health          Failure to thrive (0-17)   Assessment & Plan    -patient needs a centre capable of giving him higher levels of care as he lives alone.  -home health. Home PT and OT  - Will speak with SW about disposition

## 2017-12-03 NOTE — PROGRESS NOTES
.         Internal Medicine Interval Note  Note Author: Robert Thomas M.D.     Name Jignesh Dumont       1934   Age/Sex 83 y.o. male   MRN [id number]   Code Status FULL     After 5PM or if no immediate response to page, please call for cross-coverage  Attending/Team: Ronda/Shon See Patient List for primary contact information  Call [phone] to page    1st Call - Day Intern (R1):   Kamryn 2nd Call - Day Sr. Resident (R2/R3):   William         Reason for interval visit  (Principal Problem)   Failure to thrive    Interval Problem Daily Status Update  (24 hours)     The patient has left ankle joint fracture, in case, no pain  Right leg weakness at baseline  Stable for discharge from the medical standpoint      Review of Systems   Constitutional: Negative for chills, diaphoresis and fever.   Respiratory: Negative for cough, hemoptysis and shortness of breath.    Cardiovascular: Negative for chest pain, palpitations and leg swelling.   Gastrointestinal: Negative for abdominal pain, constipation, diarrhea, nausea and vomiting.   Genitourinary: Negative for dysuria.   Musculoskeletal: Negative for myalgias.   Neurological: Negative for dizziness and headaches.       Consultants/Specialty  none    Disposition  To be discharged    Quality Measures    Reviewed items::  Labs reviewed and Medications reviewed  Bedolla catheter::  No Bedolla  DVT prophylaxis pharmacological::  Enoxaparin (Lovenox)  Ulcer Prophylaxis::  Not indicated          Physical Exam       Vitals:    17 1653 17 2007 17 0451 17 0735   BP:  100/52 (!) 97/57 108/65   Pulse:  79 71 71   Resp:  17 16 16   Temp:  36.9 °C (98.4 °F) 36.4 °C (97.5 °F) 36.9 °C (98.5 °F)   SpO2:  90% 90% 90%   Weight: 61.2 kg (135 lb)      Height:         Body mass index is 19.37 kg/m². Weight: 61.2 kg (135 lb)  Oxygen Therapy:  Pulse Oximetry: 90 %, O2 (LPM): 0, O2 Delivery: None (Room Air)    Physical Exam   Constitutional: He is oriented to person,  place, and time and well-developed, well-nourished, and in no distress.   Eyes: Conjunctivae and EOM are normal. Pupils are equal, round, and reactive to light.   Cardiovascular: Normal rate, regular rhythm and normal heart sounds.  Exam reveals no gallop and no friction rub.    No murmur heard.  Pulmonary/Chest: Effort normal and breath sounds normal. No respiratory distress. He has no wheezes. He has no rales.   Abdominal: Soft. Bowel sounds are normal. He exhibits no distension. There is no tenderness. There is no guarding.   Large soft abdomen   Musculoskeletal: He exhibits no edema or tenderness.   L leg cast, palpable dorsalis pedis pulse, overgrown nails with yellow discoloration; R leg normal appearance, no atrophy   Neurological: He is alert and oriented to person, place, and time.   R leg strength 5/5, sensation intact bilateral legs to light touch   Skin: Skin is warm and dry.   Psychiatric: Affect and judgment normal.         Lab Data Review:         12/1/2017  3:23 PM    Recent Labs      11/30/17 2128   SODIUM  135   POTASSIUM  4.7   CHLORIDE  101   CO2  25   BUN  28*   CREATININE  0.90   CALCIUM  9.6       Recent Labs      11/30/17 2128   ALTSGPT  28   ASTSGOT  24   ALKPHOSPHAT  56   TBILIRUBIN  0.4   GLUCOSE  98       Recent Labs      11/30/17 2128   RBC  4.98   HEMOGLOBIN  15.4   HEMATOCRIT  46.2   PLATELETCT  333   PROTHROMBTM  13.2   INR  1.03       Recent Labs      11/30/17 2128   WBC  10.8   NEUTSPOLYS  67.30   LYMPHOCYTES  17.90*   MONOCYTES  12.20   EOSINOPHILS  1.50   BASOPHILS  0.60   ASTSGOT  24   ALTSGPT  28   ALKPHOSPHAT  56   TBILIRUBIN  0.4           Assessment/Plan     Ankle fracture   Assessment & Plan    Bimalleolar fracture left ankle in fibre glass cast.  Unable to mobilize and take care of himself at home due to L leg cast and R leg weakness  PT/OT evaluation and recommended SNF  No pain at this time   per hospital policy, he is not qualified for inpatient, so to be  discharged home with home PT and OT and home health          Failure to thrive (0-17)   Assessment & Plan    -patient needs a centre capable of giving him higher levels of care as he lives alone.  -home health. Home PT and OT

## 2017-12-03 NOTE — PROGRESS NOTES
Patient had large BM this am. Discharge pending possibly to skilled. Patient unable to care for self being NWB left le and hx of polio in right le. Tylenol for pain. Family requesting a podiatry consult as patients toenails are in bad shape.

## 2017-12-03 NOTE — PROGRESS NOTES
Assumed care at 1900    Patient AXO 4   No complaints of pain family at bedside .    Main concern is that patient does not wish to be discharged home as he in incapable of caring for self and family members are also not able to provide adequate care at patients home during the no weight bearing phase of the healing process .    Patient son also asked me to address the possible need to refer to a podiatrist for the condition of the patients toe nails . Confirmed will pass to AM shift nurse for further inquiry into matter.     Non weight bearing to the right leg   History of weakness related to polio as a child in the left leg     Calls for assistance   Uses Bed pan and urinal as needed

## 2017-12-04 PROBLEM — R62.7 FAILURE TO THRIVE IN ADULT: Status: ACTIVE | Noted: 2017-12-01

## 2017-12-04 LAB
ANION GAP SERPL CALC-SCNC: 7 MMOL/L (ref 0–11.9)
BUN SERPL-MCNC: 26 MG/DL (ref 8–22)
CALCIUM SERPL-MCNC: 9.1 MG/DL (ref 8.5–10.5)
CHLORIDE SERPL-SCNC: 103 MMOL/L (ref 96–112)
CO2 SERPL-SCNC: 28 MMOL/L (ref 20–33)
CREAT SERPL-MCNC: 0.83 MG/DL (ref 0.5–1.4)
GFR SERPL CREATININE-BSD FRML MDRD: >60 ML/MIN/1.73 M 2
GLUCOSE SERPL-MCNC: 170 MG/DL (ref 65–99)
POTASSIUM SERPL-SCNC: 4.6 MMOL/L (ref 3.6–5.5)
SODIUM SERPL-SCNC: 138 MMOL/L (ref 135–145)

## 2017-12-04 PROCEDURE — 700111 HCHG RX REV CODE 636 W/ 250 OVERRIDE (IP): Performed by: STUDENT IN AN ORGANIZED HEALTH CARE EDUCATION/TRAINING PROGRAM

## 2017-12-04 PROCEDURE — 36415 COLL VENOUS BLD VENIPUNCTURE: CPT

## 2017-12-04 PROCEDURE — 99224 PR SUBSEQUENT OBSERVATION CARE,LEVEL I: CPT | Mod: GC | Performed by: INTERNAL MEDICINE

## 2017-12-04 PROCEDURE — 80048 BASIC METABOLIC PNL TOTAL CA: CPT

## 2017-12-04 PROCEDURE — G0378 HOSPITAL OBSERVATION PER HR: HCPCS

## 2017-12-04 PROCEDURE — A9270 NON-COVERED ITEM OR SERVICE: HCPCS | Performed by: STUDENT IN AN ORGANIZED HEALTH CARE EDUCATION/TRAINING PROGRAM

## 2017-12-04 PROCEDURE — 700102 HCHG RX REV CODE 250 W/ 637 OVERRIDE(OP): Performed by: STUDENT IN AN ORGANIZED HEALTH CARE EDUCATION/TRAINING PROGRAM

## 2017-12-04 RX ADMIN — ENOXAPARIN SODIUM 40 MG: 100 INJECTION SUBCUTANEOUS at 07:46

## 2017-12-04 RX ADMIN — THERA TABS 1 TABLET: TAB at 07:46

## 2017-12-04 RX ADMIN — TAMSULOSIN HYDROCHLORIDE 0.4 MG: 0.4 CAPSULE ORAL at 20:31

## 2017-12-04 RX ADMIN — BUDESONIDE AND FORMOTEROL FUMARATE DIHYDRATE 2 PUFF: 160; 4.5 AEROSOL RESPIRATORY (INHALATION) at 07:46

## 2017-12-04 RX ADMIN — VITAMIN D, TAB 1000IU (100/BT) 2000 UNITS: 25 TAB at 07:46

## 2017-12-04 ASSESSMENT — PAIN SCALES - GENERAL
PAINLEVEL_OUTOF10: 0
PAINLEVEL_OUTOF10: 0

## 2017-12-04 ASSESSMENT — ENCOUNTER SYMPTOMS
HEADACHES: 0
FEVER: 0
NAUSEA: 0
HEMOPTYSIS: 0
DIAPHORESIS: 0
ABDOMINAL PAIN: 0
DIARRHEA: 0
SHORTNESS OF BREATH: 0
MYALGIAS: 0
VOMITING: 0
DIZZINESS: 0
CHILLS: 0
COUGH: 0
CONSTIPATION: 0
PALPITATIONS: 0

## 2017-12-04 NOTE — CARE PLAN
Problem: Discharge Barriers/Planning  Goal: Patient's continuum of care needs will be met    Intervention: Assess potential discharge barriers on admission and throughout hospital stay  Clarified with [facility] team, plan is to send patient to SNF, patient not safe to go home with home health as patient lives home alone and requires 2 assist to pivot, unable to ambulate at this time, PT and OT on board. Communicated to social work Christi, will place physiatry consult.       Problem: Skin Integrity  Goal: Risk for impaired skin integrity will decrease    Intervention: Assess and monitor skin integrity, appearance and/or temperature  Cast intact to right left foot, CMS intact, no other wounds noted.

## 2017-12-04 NOTE — DISCHARGE PLANNING
Updated Clinical note: via chart review  PT/OT    Update Discharge Planning:  Kaye Gomez from [facility]. She said that pt can dc today if we can get him to an SNF facility. Pt is OBS level of care and he has straight Medicare   So he will not qualify for SNF. Will talk to UR manager for options.     Addendum:  IADET  Pt said that he does not want any decisions made without daughter Janki's knowledge. I explained to him that MD recommends for him to go to SNF but he does not qualify due to OBS loc. I LVM for daughter. I also talked to Elsa Rose  Manager. She said that she can help with an CANDY.   I was advised to send referral to [facility] first. Sent choice to [facility].     Addendum:  Pt is approved for CANDY.     Addendum:  Daughter is agreeable for an SNF transfer. She prefers [facility], [facility] and [facility]. She said that she does not want [facility].     Addendum:  [facility] Liaison here. I told her that daughter prefers [facility]. She will call daughter. Liaison is aware that this is an CANDY.

## 2017-12-04 NOTE — DISCHARGE PLANNING
Call placed to [facility], left message for return call on status of CANDY. Supervisor Elsa has been advised of status of CANDY.

## 2017-12-04 NOTE — DISCHARGE PLANNING
Update Discharge Planning:  Received a call from daughter who said that she toured [facility] and she would be agreeable to have her father be transferred there. I also explained that because [facility] will be paying for the transfer then they did not have a choice and we would need to make arrangements with who we have relationships with. She understood and was agreeable.     Received message that pt was accepted by [facility].

## 2017-12-04 NOTE — PROGRESS NOTES
Pt A/O x 4. Denies of any pain. PIV to left AC, patent, dressing CDI. Cast to left ankle/feet CDI. On RA. Family at the bedside. POC discussed. All assessment done and charted. Call light and personal belongings within reach. Hourly rounding in place.

## 2017-12-04 NOTE — THERAPY
Attempted to see pt for OT tx. Pt declined at this time, pt states he was sitting up in chair for lunch and just got back to bed. Pt agreeable to OOB activity at later time. Pt reports he is using BSC as needed w/ [facility] staff. Pt encouraged to use as much as tolerated. Will try again later as able.

## 2017-12-04 NOTE — CARE PLAN
Problem: Pain Management  Goal: Pain level will decrease to patient's comfort goal    Intervention: Follow pain managment plan developed in collaboration with patient and Interdisciplinary Team  Tylenol for pain.      Problem: Mobility  Goal: Risk for activity intolerance will decrease    Intervention: Assess and monitor signs of activity intolerance  Non weight bearing will need skilled placement.

## 2017-12-04 NOTE — PROGRESS NOTES
.         Internal Medicine Interval Note  Note Author: Ayaka Manzo M.D.     Name Jignesh Dumont       1934   Age/Sex 83 y.o. male   MRN [id number]   Code Status FULL     After 5PM or if no immediate response to page, please call for cross-coverage  Attending/Team: Ronda/Shon See Patient List for primary contact information  Call [phone] to page    1st Call - Day Intern (R1):   Anais 2nd Call - Day Sr. Resident (R2/R3):   William         Reason for interval visit  (Principal Problem)   Failure to thrive    Interval Problem Daily Status Update  (24 hours)   - CANDY for SNF sent; currently awaiting approval prior to DC  - Medically clear for discharge  - To continue NWB until   - No complaints at this time; continues to work with PT/OT  - Last BM  morning        Review of Systems   Constitutional: Negative for chills, diaphoresis and fever.   Respiratory: Negative for cough, hemoptysis and shortness of breath.    Cardiovascular: Negative for chest pain, palpitations and leg swelling.   Gastrointestinal: Negative for abdominal pain, constipation, diarrhea, nausea and vomiting.   Genitourinary: Negative for dysuria.   Musculoskeletal: Negative for myalgias.   Neurological: Negative for dizziness and headaches.       Consultants/Specialty  none    Disposition  Observation status, discharge home with home health?    Quality Measures    Reviewed items::  Labs reviewed and Medications reviewed  Bedolla catheter::  No Bedolla  DVT prophylaxis pharmacological::  Enoxaparin (Lovenox)  Ulcer Prophylaxis::  Not indicated          Physical Exam       Vitals:    17 1600 17 1945 17 0351 17 0745   BP: 110/54 123/56 (!) 93/63 123/65   Pulse: 76 81  71   Resp: 16 16 16 17   Temp: 36.8 °C (98.2 °F) 36.3 °C (97.4 °F) 36.3 °C (97.3 °F) 37.2 °C (98.9 °F)   SpO2: 94% 90% 94% 90%   Weight:    61.2 kg (135 lb)   Height:         Body mass index is 19.37 kg/m². Weight: 61.2 kg (135  lb)  Oxygen Therapy:  Pulse Oximetry: 90 %, O2 (LPM): 0, O2 Delivery: None (Room Air)    Physical Exam   Constitutional: He is oriented to person, place, and time. No distress.   Elderly frail gentleman; pleasant   HENT:   Head: Normocephalic and atraumatic.   Eyes: Right eye exhibits no discharge. Left eye exhibits no discharge. No scleral icterus.   Neck: Normal range of motion.   Cardiovascular: Normal rate, regular rhythm, normal heart sounds and intact distal pulses.  Exam reveals no gallop and no friction rub.    No murmur heard.  Pulmonary/Chest: Effort normal and breath sounds normal. No respiratory distress. He has no wheezes. He has no rales.   Abdominal: Soft. Bowel sounds are normal. He exhibits no distension. There is no tenderness. There is no guarding.   Musculoskeletal: He exhibits no edema or tenderness.   L leg cast, palpable dorsalis pedis pulse, overgrown nails with yellow discoloration; R leg normal appearance, no atrophy   Neurological: He is alert and oriented to person, place, and time. GCS score is 15.   RLE: 4/5 strength, sensation intact   Skin: Skin is warm and dry. He is not diaphoretic.   Psychiatric: Affect and judgment normal.         Lab Data Review:         12/1/2017  3:23 PM    Recent Labs      12/04/17   1256   SODIUM  138   POTASSIUM  4.6   CHLORIDE  103   CO2  28   BUN  26*   CREATININE  0.83   CALCIUM  9.1       Recent Labs      12/04/17   1256   GLUCOSE  170*       No results for input(s): RBC, HEMOGLOBIN, HEMATOCRIT, PLATELETCT, PROTHROMBTM, APTT, INR, IRON, FERRITIN, TOTIRONBC in the last 72 hours.              Assessment/Plan     Ankle fracture  Bimalleolar fracture left ankle in fibre glass cast.  Unable to mobilize and take care of himself at home due to L leg cast and R leg weakness  PT/OT evaluation  No pain at this time  Patient awaiting SNF/Rehab placement      Failure to thrive in adult  -patient needs a centre capable of giving him higher levels of care as he lives  alone.  -Awaiting SNF/Rehab  - CANDY for SNF sent; awaiting finalization from SW

## 2017-12-04 NOTE — THERAPY
"Attempted to see pt for therapy. Pt declining as he has \"been up all day and doesn't have the strength anymore\". He is willing to participate tomorrow. Will continue to follow and re-attempt tomorrow.  "

## 2017-12-05 VITALS
BODY MASS INDEX: 19.33 KG/M2 | OXYGEN SATURATION: 94 % | HEART RATE: 82 BPM | DIASTOLIC BLOOD PRESSURE: 78 MMHG | WEIGHT: 135 LBS | RESPIRATION RATE: 16 BRPM | HEIGHT: 70 IN | TEMPERATURE: 98.9 F | SYSTOLIC BLOOD PRESSURE: 116 MMHG

## 2017-12-05 PROBLEM — B35.1 TOENAIL FUNGUS: Status: ACTIVE | Noted: 2017-12-05

## 2017-12-05 LAB
BASOPHILS # BLD AUTO: 0.7 % (ref 0–1.8)
BASOPHILS # BLD: 0.06 K/UL (ref 0–0.12)
EOSINOPHIL # BLD AUTO: 0.32 K/UL (ref 0–0.51)
EOSINOPHIL NFR BLD: 3.6 % (ref 0–6.9)
ERYTHROCYTE [DISTWIDTH] IN BLOOD BY AUTOMATED COUNT: 45 FL (ref 35.9–50)
HCT VFR BLD AUTO: 41.9 % (ref 42–52)
HGB BLD-MCNC: 14.1 G/DL (ref 14–18)
IMM GRANULOCYTES # BLD AUTO: 0.05 K/UL (ref 0–0.11)
IMM GRANULOCYTES NFR BLD AUTO: 0.6 % (ref 0–0.9)
LYMPHOCYTES # BLD AUTO: 2.09 K/UL (ref 1–4.8)
LYMPHOCYTES NFR BLD: 23.3 % (ref 22–41)
MCH RBC QN AUTO: 31.1 PG (ref 27–33)
MCHC RBC AUTO-ENTMCNC: 33.7 G/DL (ref 33.7–35.3)
MCV RBC AUTO: 92.3 FL (ref 81.4–97.8)
MONOCYTES # BLD AUTO: 0.89 K/UL (ref 0–0.85)
MONOCYTES NFR BLD AUTO: 9.9 % (ref 0–13.4)
NEUTROPHILS # BLD AUTO: 5.55 K/UL (ref 1.82–7.42)
NEUTROPHILS NFR BLD: 61.9 % (ref 44–72)
NRBC # BLD AUTO: 0 K/UL
NRBC BLD AUTO-RTO: 0 /100 WBC
PLATELET # BLD AUTO: 363 K/UL (ref 164–446)
PMV BLD AUTO: 9.5 FL (ref 9–12.9)
RBC # BLD AUTO: 4.54 M/UL (ref 4.7–6.1)
WBC # BLD AUTO: 9 K/UL (ref 4.8–10.8)

## 2017-12-05 PROCEDURE — A9270 NON-COVERED ITEM OR SERVICE: HCPCS | Performed by: STUDENT IN AN ORGANIZED HEALTH CARE EDUCATION/TRAINING PROGRAM

## 2017-12-05 PROCEDURE — 85025 COMPLETE CBC W/AUTO DIFF WBC: CPT

## 2017-12-05 PROCEDURE — 700102 HCHG RX REV CODE 250 W/ 637 OVERRIDE(OP): Performed by: STUDENT IN AN ORGANIZED HEALTH CARE EDUCATION/TRAINING PROGRAM

## 2017-12-05 PROCEDURE — 700111 HCHG RX REV CODE 636 W/ 250 OVERRIDE (IP): Performed by: STUDENT IN AN ORGANIZED HEALTH CARE EDUCATION/TRAINING PROGRAM

## 2017-12-05 PROCEDURE — 36415 COLL VENOUS BLD VENIPUNCTURE: CPT

## 2017-12-05 PROCEDURE — G0378 HOSPITAL OBSERVATION PER HR: HCPCS

## 2017-12-05 PROCEDURE — 99217 PR OBSERVATION CARE DISCHARGE: CPT | Mod: GC | Performed by: INTERNAL MEDICINE

## 2017-12-05 RX ADMIN — THERA TABS 1 TABLET: TAB at 08:44

## 2017-12-05 RX ADMIN — ENOXAPARIN SODIUM 40 MG: 100 INJECTION SUBCUTANEOUS at 08:44

## 2017-12-05 RX ADMIN — STANDARDIZED SENNA CONCENTRATE AND DOCUSATE SODIUM 2 TABLET: 8.6; 5 TABLET, FILM COATED ORAL at 08:44

## 2017-12-05 RX ADMIN — VITAMIN D, TAB 1000IU (100/BT) 2000 UNITS: 25 TAB at 08:44

## 2017-12-05 ASSESSMENT — PAIN SCALES - GENERAL: PAINLEVEL_OUTOF10: 0

## 2017-12-05 NOTE — DISCHARGE PLANNING
CCS called left messages the admissions team at [facility]  And the [facility] Liaison to arrange transportation to transfer. CCS will follow.

## 2017-12-05 NOTE — PROGRESS NOTES
Patient discharged, escorted by transport to [facility], signed copy of discharge instructions in chart, gave report to MARIANGEL Maloney at [facility], vitals stable prior to transfer, patient verbalized understanding regarding continued plan of care.

## 2017-12-05 NOTE — RESPIRATORY CARE
COPD EDUCATION by COPD CLINICAL EDUCATOR  12/5/2017 at 6:37 AM by Theresa Fam     Patient reviewed by COPD education team. Patient does not qualify for COPD program.

## 2017-12-05 NOTE — DISCHARGE PLANNING
Update Discharge Planning:  Discharge to [facility] today at 3pm   Daughter Janki is aware. She will meet pt at [facility].

## 2017-12-05 NOTE — CARE PLAN
Problem: Safety  Goal: Will remain free from injury  Outcome: PROGRESSING AS EXPECTED  Call light and personal belongings within reach; bed in the lowest position and wheels locked; hourly rounding in place.

## 2017-12-05 NOTE — DISCHARGE INSTRUCTIONS
Discharge Instructions    Discharged to other by [facility] with escort. Discharged via wheelchair, hospital escort: Yes.  Special equipment needed: Walker    Non-weight bearing to left foot for six weeks unit 1/8/2018, use walker at all times for ambulation.     Be sure to schedule a follow-up appointment with your primary care doctor or any specialists as instructed.     Discharge Plan:   Diet Plan: Discussed  Activity Level: Discussed  Confirmed Follow up Appointment: Patient to Call and Schedule Appointment  Confirmed Symptoms Management: Discussed  Medication Reconciliation Updated: Yes  Influenza Vaccine Indication: Patient Refuses    I understand that a diet low in cholesterol, fat, and sodium is recommended for good health. Unless I have been given specific instructions below for another diet, I accept this instruction as my diet prescription.   Other diet: Regular    Special Instructions: Discharge instructions for the Orthopedic Patient    Follow up with Primary Care Physician within 2 weeks of discharge to home, regarding:  Review of medications and diagnostic testing.  Surveillance for medical complications.  Workup and treatment of osteoporosis, if appropriate.     -Is this a Joint Replacement patient? No    -Is this patient being discharged with medication to prevent blood clots?  Yes, Lovenox Enoxaparin injection  What is this medicine?  ENOXAPARIN (ee nox a PA rin) is used after knee, hip, or abdominal surgeries to prevent blood clotting. It is also used to treat existing blood clots in the lungs or in the veins.  This medicine may be used for other purposes; ask your health care provider or pharmacist if you have questions.  COMMON BRAND NAME(S): Lovenox  What should I tell my health care provider before I take this medicine?  They need to know if you have any of these conditions:  -bleeding disorders, hemorrhage, or hemophilia  -infection of the heart or heart valves  -kidney or liver  disease  -previous stroke  -prosthetic heart valve  -recent surgery or delivery of a baby  -ulcer in the stomach or intestine, diverticulitis, or other bowel disease  -an unusual or allergic reaction to enoxaparin, heparin, pork or pork products, other medicines, foods, dyes, or preservatives  -pregnant or trying to get pregnant  -breast-feeding  How should I use this medicine?  This medicine is for injection under the skin. It is usually given by a health-care professional. You or a family member may be trained on how to give the injections. If you are to give yourself injections, make sure you understand how to use the syringe, measure the dose if necessary, and give the injection. To avoid bruising, do not rub the site where this medicine has been injected. Do not take your medicine more often than directed. Do not stop taking except on the advice of your doctor or health care professional.  Make sure you receive a puncture-resistant container to dispose of the needles and syringes once you have finished with them. Do not reuse these items. Return the container to your doctor or health care professional for proper disposal.  Talk to your pediatrician regarding the use of this medicine in children. Special care may be needed.  Overdosage: If you think you have taken too much of this medicine contact a poison control center or emergency room at once.  NOTE: This medicine is only for you. Do not share this medicine with others.  What if I miss a dose?  If you miss a dose, take it as soon as you can. If it is almost time for your next dose, take only that dose. Do not take double or extra doses.  What may interact with this medicine?  Do not take this medicine with any of the following medications:  -aspirin and aspirin-like medicines  -heparin  -mifepristone  -palifermin  -warfarin   This medicine may also interact with the following medications:  -cilostazol  -clopidogrel  -dipyridamole  -NSAIDs, medicines for pain  and inflammation, like ibuprofen or naproxen  -sulfinpyrazone  -ticlopidine  This list may not describe all possible interactions. Give your health care provider a list of all the medicines, herbs, non-prescription drugs, or dietary supplements you use. Also tell them if you smoke, drink alcohol, or use illegal drugs. Some items may interact with your medicine.  What should I watch for while using this medicine?  Visit your doctor or health care professional for regular checks on your progress. Your condition will be monitored carefully while you are receiving this medicine.  If you are going to have surgery, tell your doctor or health care professional that you are taking this medicine.  Do not stop taking this medicine without first talking to your doctor. Be sure to refill your prescription before you run out of medicine.  Avoid sports and activities that might cause injury while you are using this medicine. Severe falls or injuries can cause unseen bleeding. Be careful when using sharp tools or knives. Consider using an electric razor. Take special care brushing or flossing your teeth. Report any injuries, bruising, or red spots on the skin to your doctor or health care professional.  What side effects may I notice from receiving this medicine?  Side effects that you should report to your doctor or health care professional as soon as possible:  -allergic reactions like skin rash, itching or hives, swelling of the face, lips, or tongue  -dark urine  -feeling faint or lightheaded, falls  -fever  -heavy menstrual bleeding  -signs and symptoms of bleeding such as bloody or black, tarry stools; red or dark-brown urine; spitting up blood or brown material that looks like coffee grounds; red spots on the skin; unusual bruising or bleeding from the eye, gums, or nose  -signs and symptoms of a blood clot such as breathing problems; changes in vision; chest pain; severe, sudden headache; pain, swelling, warmth in the leg;  trouble speaking; sudden numbness or weakness of the face, arm or leg  Side effects that usually do not require medical attention (report to your doctor or health care professional if they continue or are bothersome):  -pain or irritation at the injection site  This list may not describe all possible side effects. Call your doctor for medical advice about side effects. You may report side effects to FDA at 1-456-ZBP-6433.  Where should I keep my medicine?  Keep out of the reach of children.  Store at room temperature between 15 and 30 degrees C (59 and 86 degrees F). Do not freeze. If your injections have been specially prepared, you may need to store them in the refrigerator. Ask your pharmacist. Throw away any unused medicine after the expiration date.  NOTE: This sheet is a summary. It may not cover all possible information. If you have questions about this medicine, talk to your doctor, pharmacist, or health care provider.  © 2014, Elsevier/Gold Standard. (3/31/2014 10:04:27 AM)      · Is patient discharged on Warfarin / Coumadin?   No     · Is patient Post Blood Transfusion?  No      Cast or Splint Care  Casts and splints support injured limbs and keep bones from moving while they heal. It is important to care for your cast or splint at home.    HOME CARE INSTRUCTIONS  · Keep the cast or splint uncovered during the drying period. It can take 24 to 48 hours to dry if it is made of plaster. A fiberglass cast will dry in less than 1 hour.  · Do not rest the cast on anything harder than a pillow for the first 24 hours.  · Do not put weight on your injured limb or apply pressure to the cast until your health care provider gives you permission.  · Keep the cast or splint dry. Wet casts or splints can lose their shape and may not support the limb as well. A wet cast that has lost its shape can also create harmful pressure on your skin when it dries. Also, wet skin can become infected.  ¨ Cover the cast or splint with  a plastic bag when bathing or when out in the rain or snow. If the cast is on the trunk of the body, take sponge baths until the cast is removed.  ¨ If your cast does become wet, dry it with a towel or a blow dryer on the cool setting only.  · Keep your cast or splint clean. Soiled casts may be wiped with a moistened cloth.  · Do not place any hard or soft foreign objects under your cast or splint, such as cotton, toilet paper, lotion, or powder.  · Do not try to scratch the skin under the cast with any object. The object could get stuck inside the cast. Also, scratching could lead to an infection. If itching is a problem, use a blow dryer on a cool setting to relieve discomfort.  · Do not trim or cut your cast or remove padding from inside of it.  · Exercise all joints next to the injury that are not immobilized by the cast or splint. For example, if you have a long leg cast, exercise the hip joint and toes. If you have an arm cast or splint, exercise the shoulder, elbow, thumb, and fingers.  · Elevate your injured arm or leg on 1 or 2 pillows for the first 1 to 3 days to decrease swelling and pain. It is best if you can comfortably elevate your cast so it is higher than your heart.  SEEK MEDICAL CARE IF:   · Your cast or splint cracks.  · Your cast or splint is too tight or too loose.  · You have unbearable itching inside the cast.  · Your cast becomes wet or develops a soft spot or area.  · You have a bad smell coming from inside your cast.  · You get an object stuck under your cast.  · Your skin around the cast becomes red or raw.  · You have new pain or worsening pain after the cast has been applied.  SEEK IMMEDIATE MEDICAL CARE IF:   · You have fluid leaking through the cast.  · You are unable to move your fingers or toes.  · You have discolored (blue or white), cool, painful, or very swollen fingers or toes beyond the cast.  · You have tingling or numbness around the injured area.  · You have severe pain or  pressure under the cast.  · You have any difficulty with your breathing or have shortness of breath.  · You have chest pain.     This information is not intended to replace advice given to you by your health care provider. Make sure you discuss any questions you have with your health care provider.     Document Released: 12/15/2001 Document Revised: 10/08/2014 Document Reviewed: 06/26/2014  [employer] Interactive Patient Education ©2016 Elsevier Inc.    Depression / Suicide Risk    As you are discharged from this [facility] facility, it is important to learn how to keep safe from harming yourself.    Recognize the warning signs:  · Abrupt changes in personality, positive or negative- including increase in energy   · Giving away possessions  · Change in eating patterns- significant weight changes-  positive or negative  · Change in sleeping patterns- unable to sleep or sleeping all the time   · Unwillingness or inability to communicate  · Depression  · Unusual sadness, discouragement and loneliness  · Talk of wanting to die  · Neglect of personal appearance   · Rebelliousness- reckless behavior  · Withdrawal from people/activities they love  · Confusion- inability to concentrate     If you or a loved one observes any of these behaviors or has concerns about self-harm, here's what you can do:  · Talk about it- your feelings and reasons for harming yourself  · Remove any means that you might use to hurt yourself (examples: pills, rope, extension cords, firearm)  · Get professional help from the community (Mental Health, Substance Abuse, psychological counseling)  · Do not be alone:Call your Safe Contact- someone whom you trust who will be there for you.  · Call your local CRISIS HOTLINE 825-6582 or 810-825-3856  · Call your local Children's Mobile Crisis Response Team [place] (953) 954-2507 or www.Signicast  · Call the toll free National Suicide Prevention Hotlines   · National Suicide Prevention Lifeline  939-945-VPUZ (4466)  · National [place] Line Network 800-SUICIDE (446-1356)

## 2017-12-05 NOTE — DISCHARGE SUMMARY
Internal Medicine Discharge Summary  Note Author: Ayaka Manzo M.D.       Admit Date:  11/30/2017       Discharge Date:   12/05/2017    Service:   [facility] Internal Medicine [facility] Team  Attending Physician(s):   Dr. Bond  Senior Resident(s):   Dr. Thomas  [place] Resident(s):   Dr. Manzo      Primary Diagnosis:   Failure to thrive in adult      Secondary Diagnoses:                Active Problems:    Ankle fracture POA: Unknown    Failure to thrive in adult POA: Unknown  Resolved Problems:    * No resolved hospital problems. *      Hospital Summary (Brief Narrative):       A pleasant 83-year-old man who was brought in by his family due to not being able to care for himself, as he lives alone. He broke his left ankle a few days ago and is now in a cast (no head trauma).  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  His family looked into several group homes, but was told that they were unable to provide the care their father needed, that of a skilled nursing facility.  As such, the patient was brought here and subsequently admitted for observation.  While here, he was seen and evaluated by PT and OT with recommendations for SNF.  He has since been accepted to [facility].  At this time, he is stable and in agreement for discharge.  Left ankle remains in a cast and he is to be NWB until 01/08/2018 (total 6 weeks).  Patient is to remain on Lovenox for DVT prophylaxis upon discharge until he is able to ambulate >100ft/day.  Discontinuation to be addressed by SNF.      Patient /Hospital Summary (Details -- Problem Oriented) :          Ankle fracture   Assessment & Plan    - Bimalleolar fracture left ankle in fibre glass cast.  - Unable to mobilize and take care of himself at home due to L leg cast and R leg weakness  - PT/OT evaluation with recommendations for SNF  - No pain at this time  - L. Ankle NWB until 01/08/2018 (total 6 weeks)  -  Recommend Orthopedics follow up           * Failure to thrive in adult   Assessment & Plan    - patient needs a centre capable of giving him higher levels of care as he lives alone  - to continue care at [facility]          Toenail fungus   Assessment & Plan    - Patient requesting Podiatry consult  - Outpatient consult upon discharge from [facility]            Consultants:     none    Procedures:        none    Imaging/ Testing:      none    Discharge Medications:         Medication Reconciliation: Completed       Medication List      CONTINUE taking these medications      Instructions   fluticasone-salmeterol 250-50 MCG/DOSE Aepb  Commonly known as:  ADVAIR   Inhale 1 Puff by mouth every day.  Dose:  1 Puff     multivitamin Tabs   Take 1 Tab by mouth every morning.  Dose:  1 Tab     sulfamethoxazole-trimethoprim 800-160 MG tablet  Commonly known as:  BACTRIM DS   Take 1 Tab by mouth 2 times a day.  Dose:  1 Tab     tamsulosin 0.4 MG capsule  Commonly known as:  FLOMAX   Take 0.4 mg by mouth every evening.  Dose:  0.4 mg     TUDORZA PRESSAIR 400 MCG/ACT Aepb  Generic drug:  Aclidinium Bromide   Inhale 1 Puff by mouth 2 Times a Day.  Dose:  1 Puff     Vitamin D3 1000 units Caps   Take 2,000 Units by mouth every morning.  Dose:  2000 Units            Can use .DISCHARGEMEDSLIST if going to another facility         Disposition:   [facility]    Diet:   Regular    Activity:   Per SNF/PT/OT    Instructions:      The patient was instructed to return to the ER in the event of worsening symptoms. I have counseled the patient on the importance of compliance and the patient has agreed to proceed with all medical recommendations and follow up plan indicated above.   The patient understands that all medications come with benefits and risks. Risks may include permanent injury or death and these risks can be minimized with close reassessment and monitoring.        Primary Care Provider:    Andrey Villanueva,  M.D.    Discharge summary faxed to primary care provider:  Completed  Copy of discharge summary given to the patient: Completed      Follow up appointment details :      1. Orthopedics follow up for ankle fracture (Patient to call)  2. Recommendation for Podiatry consult per Patient request (may be arranged by SNF)    Pending Studies:        none    Time spent on discharge day patient visit, preparing discharge paperwork and arranging for patient follow up.    Summary of follow up issues:   As above    Discharge Time (Minutes) :    45  Hospital Course Type: Observation Stay      Condition on Discharge    ______________________________________________________________________    Interval history/exam for day of discharge:    - no acute overnight events  - denies any lower extremity pain/swelling/parasthesia/CP/SOB  - tolerating diet  - pain well controlled  - last BM was last night  - ready and excited for discharge    Vitals:    12/04/17 1600 12/04/17 1845 12/05/17 0305 12/05/17 0733   BP: 106/59 122/66 110/62 119/68   Pulse: 79 76 80 93   Resp: 17 18 16 14   Temp: 36.8 °C (98.3 °F) 37.3 °C (99.2 °F) 37.4 °C (99.3 °F) 36.8 °C (98.2 °F)   SpO2: 94% 90% 92% 97%   Weight:       Height:         Weight/BMI: Body mass index is 19.37 kg/m².  Pulse Oximetry: 97 %, O2 (LPM): 0, O2 Delivery: None (Room Air)    General: elderly, frail but well-appearing gentleman, pleasant  CVS: RRR, no MRG  PULM: CTABL, no rhonchi, crackles, wheezing  EXT: left lower extremity in hard plaster cast, RLE shows no swelling or tenderness.  Toenails of left foot are deformed and yellow.  2+ distal pulses bilaterally    Most Recent Labs:    Lab Results   Component Value Date/Time    WBC 9.0 12/05/2017 01:17 AM    RBC 4.54 (L) 12/05/2017 01:17 AM    HEMOGLOBIN 14.1 12/05/2017 01:17 AM    HEMATOCRIT 41.9 (L) 12/05/2017 01:17 AM    MCV 92.3 12/05/2017 01:17 AM    MCH 31.1 12/05/2017 01:17 AM    MCHC 33.7 12/05/2017 01:17 AM    MPV 9.5 12/05/2017 01:17  AM    NEUTSPOLYS 61.90 12/05/2017 01:17 AM    LYMPHOCYTES 23.30 12/05/2017 01:17 AM    MONOCYTES 9.90 12/05/2017 01:17 AM    EOSINOPHILS 3.60 12/05/2017 01:17 AM    BASOPHILS 0.70 12/05/2017 01:17 AM      Lab Results   Component Value Date/Time    SODIUM 138 12/04/2017 12:56 PM    POTASSIUM 4.6 12/04/2017 12:56 PM    CHLORIDE 103 12/04/2017 12:56 PM    CO2 28 12/04/2017 12:56 PM    GLUCOSE 170 (H) 12/04/2017 12:56 PM    BUN 26 (H) 12/04/2017 12:56 PM    CREATININE 0.83 12/04/2017 12:56 PM      Lab Results   Component Value Date/Time    ALTSGPT 28 11/30/2017 09:28 PM    ASTSGOT 24 11/30/2017 09:28 PM    ALKPHOSPHAT 56 11/30/2017 09:28 PM    TBILIRUBIN 0.4 11/30/2017 09:28 PM    ALBUMIN 3.8 11/30/2017 09:28 PM    GLOBULIN 3.4 11/30/2017 09:28 PM    INR 1.03 11/30/2017 09:28 PM     Lab Results   Component Value Date/Time    PROTHROMBTM 13.2 11/30/2017 09:28 PM    INR 1.03 11/30/2017 09:28 PM

## 2017-12-05 NOTE — DISCHARGE PLANNING
CCS received a transport form to arrange transportation to transfer the patient to [facility]. CCS called and left a message with Sathish at [facility].

## 2017-12-05 NOTE — DISCHARGE PLANNING
Received call from Gina at [facility] they have accepted patient with [facility], pending bed availability.  Possible open bed on 12-05-17.  Supervisor Elsa has been advised of acceptance for [facility]. CCT Drew has  been advised.

## 2017-12-05 NOTE — DISCHARGE PLANNING
CCS received incorrect transport form. The patient needs to transfer to [facility]. CCS called and spoke to [place] at [facility]. Transportation has been arranged to transfer the patient to [facility] today at 1500 via the [facility] Van

## 2017-12-05 NOTE — CARE PLAN
Problem: Discharge Barriers/Planning  Goal: Patient's continuum of care needs will be met    Intervention: Assess potential discharge barriers on admission and throughout hospital stay  Patient medically cleared, discharging today to [facility] at 1500, patient aware with discharge plan.       Problem: Skin Integrity  Goal: Risk for impaired skin integrity will decrease    Intervention: Assess and monitor skin integrity, appearance and/or temperature  Cast intact, CMS intact, no other wounds noted.

## 2017-12-06 ENCOUNTER — HOSPITAL ENCOUNTER (OUTPATIENT)
Dept: LAB | Facility: MEDICAL CENTER | Age: 82
End: 2017-12-06
Attending: INTERNAL MEDICINE
Payer: COMMERCIAL

## 2017-12-06 LAB
ANION GAP SERPL CALC-SCNC: 7 MMOL/L (ref 0–11.9)
BASOPHILS # BLD AUTO: 0.7 % (ref 0–1.8)
BASOPHILS # BLD: 0.07 K/UL (ref 0–0.12)
BUN SERPL-MCNC: 28 MG/DL (ref 8–22)
CALCIUM SERPL-MCNC: 9.2 MG/DL (ref 8.5–10.5)
CHLORIDE SERPL-SCNC: 104 MMOL/L (ref 96–112)
CO2 SERPL-SCNC: 26 MMOL/L (ref 20–33)
CREAT SERPL-MCNC: 0.83 MG/DL (ref 0.5–1.4)
EOSINOPHIL # BLD AUTO: 0.31 K/UL (ref 0–0.51)
EOSINOPHIL NFR BLD: 3.1 % (ref 0–6.9)
ERYTHROCYTE [DISTWIDTH] IN BLOOD BY AUTOMATED COUNT: 45.1 FL (ref 35.9–50)
GFR SERPL CREATININE-BSD FRML MDRD: >60 ML/MIN/1.73 M 2
GLUCOSE SERPL-MCNC: 88 MG/DL (ref 65–99)
HCT VFR BLD AUTO: 46.2 % (ref 42–52)
HGB BLD-MCNC: 15.2 G/DL (ref 14–18)
IMM GRANULOCYTES # BLD AUTO: 0.04 K/UL (ref 0–0.11)
IMM GRANULOCYTES NFR BLD AUTO: 0.4 % (ref 0–0.9)
LYMPHOCYTES # BLD AUTO: 2.12 K/UL (ref 1–4.8)
LYMPHOCYTES NFR BLD: 21.5 % (ref 22–41)
MCH RBC QN AUTO: 30.7 PG (ref 27–33)
MCHC RBC AUTO-ENTMCNC: 32.9 G/DL (ref 33.7–35.3)
MCV RBC AUTO: 93.3 FL (ref 81.4–97.8)
MONOCYTES # BLD AUTO: 0.97 K/UL (ref 0–0.85)
MONOCYTES NFR BLD AUTO: 9.8 % (ref 0–13.4)
NEUTROPHILS # BLD AUTO: 6.35 K/UL (ref 1.82–7.42)
NEUTROPHILS NFR BLD: 64.5 % (ref 44–72)
NRBC # BLD AUTO: 0 K/UL
NRBC BLD AUTO-RTO: 0 /100 WBC
PLATELET # BLD AUTO: 385 K/UL (ref 164–446)
PMV BLD AUTO: 9.6 FL (ref 9–12.9)
POTASSIUM SERPL-SCNC: 4.3 MMOL/L (ref 3.6–5.5)
RBC # BLD AUTO: 4.95 M/UL (ref 4.7–6.1)
SODIUM SERPL-SCNC: 137 MMOL/L (ref 135–145)
WBC # BLD AUTO: 9.9 K/UL (ref 4.8–10.8)

## 2017-12-11 LAB
ALBUMIN SERPL BCP-MCNC: 4 G/DL (ref 3.2–4.9)
ALBUMIN/GLOB SERPL: 1.2 G/DL
ALP SERPL-CCNC: 77 U/L (ref 30–99)
ALT SERPL-CCNC: 42 U/L (ref 2–50)
ANION GAP SERPL CALC-SCNC: 11 MMOL/L (ref 0–11.9)
AST SERPL-CCNC: 42 U/L (ref 12–45)
BASOPHILS # BLD AUTO: 0.5 % (ref 0–1.8)
BASOPHILS # BLD: 0.05 K/UL (ref 0–0.12)
BILIRUB SERPL-MCNC: 0.4 MG/DL (ref 0.1–1.5)
BUN SERPL-MCNC: 19 MG/DL (ref 8–22)
CALCIUM SERPL-MCNC: 9.4 MG/DL (ref 8.5–10.5)
CHLORIDE SERPL-SCNC: 98 MMOL/L (ref 96–112)
CO2 SERPL-SCNC: 24 MMOL/L (ref 20–33)
CREAT SERPL-MCNC: 0.86 MG/DL (ref 0.5–1.4)
EOSINOPHIL # BLD AUTO: 0 K/UL (ref 0–0.51)
EOSINOPHIL NFR BLD: 0 % (ref 0–6.9)
ERYTHROCYTE [DISTWIDTH] IN BLOOD BY AUTOMATED COUNT: 46.8 FL (ref 35.9–50)
GFR SERPL CREATININE-BSD FRML MDRD: >60 ML/MIN/1.73 M 2
GLOBULIN SER CALC-MCNC: 3.3 G/DL (ref 1.9–3.5)
GLUCOSE SERPL-MCNC: 103 MG/DL (ref 65–99)
HCT VFR BLD AUTO: 50 % (ref 42–52)
HGB BLD-MCNC: 16.2 G/DL (ref 14–18)
IMM GRANULOCYTES # BLD AUTO: 0.06 K/UL (ref 0–0.11)
IMM GRANULOCYTES NFR BLD AUTO: 0.7 % (ref 0–0.9)
LYMPHOCYTES # BLD AUTO: 0.77 K/UL (ref 1–4.8)
LYMPHOCYTES NFR BLD: 8.5 % (ref 22–41)
MCH RBC QN AUTO: 30.6 PG (ref 27–33)
MCHC RBC AUTO-ENTMCNC: 32.4 G/DL (ref 33.7–35.3)
MCV RBC AUTO: 94.5 FL (ref 81.4–97.8)
MONOCYTES # BLD AUTO: 0.59 K/UL (ref 0–0.85)
MONOCYTES NFR BLD AUTO: 6.5 % (ref 0–13.4)
NEUTROPHILS # BLD AUTO: 7.63 K/UL (ref 1.82–7.42)
NEUTROPHILS NFR BLD: 83.8 % (ref 44–72)
NRBC # BLD AUTO: 0 K/UL
NRBC BLD AUTO-RTO: 0 /100 WBC
PLATELET # BLD AUTO: 367 K/UL (ref 164–446)
PMV BLD AUTO: 9.5 FL (ref 9–12.9)
POTASSIUM SERPL-SCNC: 4.5 MMOL/L (ref 3.6–5.5)
PROT SERPL-MCNC: 7.3 G/DL (ref 6–8.2)
RBC # BLD AUTO: 5.29 M/UL (ref 4.7–6.1)
SODIUM SERPL-SCNC: 133 MMOL/L (ref 135–145)
WBC # BLD AUTO: 9.1 K/UL (ref 4.8–10.8)

## 2017-12-12 ENCOUNTER — RESOLUTE PROFESSIONAL BILLING HOSPITAL PROF FEE (OUTPATIENT)
Dept: HOSPITALIST | Facility: MEDICAL CENTER | Age: 82
End: 2017-12-12
Payer: MEDICARE

## 2017-12-12 ENCOUNTER — APPOINTMENT (OUTPATIENT)
Dept: RADIOLOGY | Facility: IMAGING CENTER | Age: 82
End: 2017-12-12
Attending: INTERNAL MEDICINE
Payer: MEDICARE

## 2017-12-12 ENCOUNTER — HOSPITAL ENCOUNTER (INPATIENT)
Facility: MEDICAL CENTER | Age: 82
LOS: 4 days | DRG: 190 | End: 2017-12-16
Attending: EMERGENCY MEDICINE | Admitting: HOSPITALIST
Payer: MEDICARE

## 2017-12-12 ENCOUNTER — APPOINTMENT (OUTPATIENT)
Dept: RADIOLOGY | Facility: IMAGING CENTER | Age: 82
End: 2017-12-12
Attending: PHYSICIAN ASSISTANT
Payer: MEDICARE

## 2017-12-12 ENCOUNTER — APPOINTMENT (OUTPATIENT)
Dept: RADIOLOGY | Facility: MEDICAL CENTER | Age: 82
DRG: 190 | End: 2017-12-12
Attending: EMERGENCY MEDICINE
Payer: MEDICARE

## 2017-12-12 ENCOUNTER — APPOINTMENT (OUTPATIENT)
Dept: RADIOLOGY | Facility: MEDICAL CENTER | Age: 82
DRG: 190 | End: 2017-12-12
Attending: HOSPITALIST
Payer: MEDICARE

## 2017-12-12 DIAGNOSIS — R79.89 ELEVATED TROPONIN: ICD-10-CM

## 2017-12-12 DIAGNOSIS — R07.9 CHEST PAIN, UNSPECIFIED TYPE: ICD-10-CM

## 2017-12-12 DIAGNOSIS — I95.9 HYPOTENSION, UNSPECIFIED HYPOTENSION TYPE: ICD-10-CM

## 2017-12-12 DIAGNOSIS — M54.9 CHRONIC BACK PAIN, UNSPECIFIED BACK LOCATION, UNSPECIFIED BACK PAIN LATERALITY: ICD-10-CM

## 2017-12-12 DIAGNOSIS — G89.29 CHRONIC BACK PAIN, UNSPECIFIED BACK LOCATION, UNSPECIFIED BACK PAIN LATERALITY: ICD-10-CM

## 2017-12-12 LAB
ALBUMIN SERPL BCP-MCNC: 3 G/DL (ref 3.2–4.9)
ALBUMIN/GLOB SERPL: 1.1 G/DL
ALP SERPL-CCNC: 57 U/L (ref 30–99)
ALT SERPL-CCNC: 57 U/L (ref 2–50)
ANION GAP SERPL CALC-SCNC: 7 MMOL/L (ref 0–11.9)
APPEARANCE UR: ABNORMAL
APTT PPP: 37.7 SEC (ref 24.7–36)
AST SERPL-CCNC: 89 U/L (ref 12–45)
BACTERIA #/AREA URNS HPF: NEGATIVE /HPF
BASOPHILS # BLD AUTO: 0.4 % (ref 0–1.8)
BASOPHILS # BLD: 0.03 K/UL (ref 0–0.12)
BILIRUB SERPL-MCNC: 0.3 MG/DL (ref 0.1–1.5)
BILIRUB UR QL STRIP.AUTO: NEGATIVE
BNP SERPL-MCNC: 357 PG/ML (ref 0–100)
BUN SERPL-MCNC: 34 MG/DL (ref 8–22)
CALCIUM SERPL-MCNC: 8.2 MG/DL (ref 8.5–10.5)
CHLORIDE SERPL-SCNC: 107 MMOL/L (ref 96–112)
CO2 SERPL-SCNC: 22 MMOL/L (ref 20–33)
COLOR UR: YELLOW
CREAT SERPL-MCNC: 0.9 MG/DL (ref 0.5–1.4)
DEPRECATED D DIMER PPP IA-ACNC: 1504 NG/ML(D-DU)
EOSINOPHIL # BLD AUTO: 0 K/UL (ref 0–0.51)
EOSINOPHIL NFR BLD: 0 % (ref 0–6.9)
EPI CELLS #/AREA URNS HPF: ABNORMAL /HPF
ERYTHROCYTE [DISTWIDTH] IN BLOOD BY AUTOMATED COUNT: 46.5 FL (ref 35.9–50)
FLUAV RNA SPEC QL NAA+PROBE: NEGATIVE
FLUBV RNA SPEC QL NAA+PROBE: NEGATIVE
GFR SERPL CREATININE-BSD FRML MDRD: >60 ML/MIN/1.73 M 2
GLOBULIN SER CALC-MCNC: 2.7 G/DL (ref 1.9–3.5)
GLUCOSE SERPL-MCNC: 94 MG/DL (ref 65–99)
GLUCOSE UR STRIP.AUTO-MCNC: NEGATIVE MG/DL
GRAN CASTS #/AREA URNS LPF: ABNORMAL /LPF
HCT VFR BLD AUTO: 43 % (ref 42–52)
HGB BLD-MCNC: 14.3 G/DL (ref 14–18)
HYALINE CASTS #/AREA URNS LPF: ABNORMAL /LPF
IMM GRANULOCYTES # BLD AUTO: 0.05 K/UL (ref 0–0.11)
IMM GRANULOCYTES NFR BLD AUTO: 0.7 % (ref 0–0.9)
INR PPP: 1.16 (ref 0.87–1.13)
KETONES UR STRIP.AUTO-MCNC: NEGATIVE MG/DL
LACTATE BLD-SCNC: 1.5 MMOL/L (ref 0.5–2)
LACTATE BLD-SCNC: 1.7 MMOL/L (ref 0.5–2)
LEUKOCYTE ESTERASE UR QL STRIP.AUTO: NEGATIVE
LYMPHOCYTES # BLD AUTO: 0.48 K/UL (ref 1–4.8)
LYMPHOCYTES NFR BLD: 6.3 % (ref 22–41)
MCH RBC QN AUTO: 30.8 PG (ref 27–33)
MCHC RBC AUTO-ENTMCNC: 33.3 G/DL (ref 33.7–35.3)
MCV RBC AUTO: 92.7 FL (ref 81.4–97.8)
MICRO URNS: ABNORMAL
MONOCYTES # BLD AUTO: 0.32 K/UL (ref 0–0.85)
MONOCYTES NFR BLD AUTO: 4.2 % (ref 0–13.4)
NEUTROPHILS # BLD AUTO: 6.76 K/UL (ref 1.82–7.42)
NEUTROPHILS NFR BLD: 88.4 % (ref 44–72)
NITRITE UR QL STRIP.AUTO: NEGATIVE
NRBC # BLD AUTO: 0 K/UL
NRBC BLD AUTO-RTO: 0 /100 WBC
PH UR STRIP.AUTO: 5 [PH]
PLATELET # BLD AUTO: 276 K/UL (ref 164–446)
PMV BLD AUTO: 9.8 FL (ref 9–12.9)
POTASSIUM SERPL-SCNC: 4.2 MMOL/L (ref 3.6–5.5)
PROT SERPL-MCNC: 5.7 G/DL (ref 6–8.2)
PROT UR QL STRIP: 30 MG/DL
PROTHROMBIN TIME: 14.5 SEC (ref 12–14.6)
RBC # BLD AUTO: 4.64 M/UL (ref 4.7–6.1)
RBC # URNS HPF: ABNORMAL /HPF
RBC UR QL AUTO: ABNORMAL
SODIUM SERPL-SCNC: 136 MMOL/L (ref 135–145)
SP GR UR STRIP.AUTO: 1.02
TROPONIN I SERPL-MCNC: 2.37 NG/ML (ref 0–0.04)
URATE CRY #/AREA URNS HPF: POSITIVE /HPF
UROBILINOGEN UR STRIP.AUTO-MCNC: 0.2 MG/DL
WBC # BLD AUTO: 7.6 K/UL (ref 4.8–10.8)
WBC #/AREA URNS HPF: ABNORMAL /HPF

## 2017-12-12 PROCEDURE — 99285 EMERGENCY DEPT VISIT HI MDM: CPT

## 2017-12-12 PROCEDURE — 80053 COMPREHEN METABOLIC PANEL: CPT

## 2017-12-12 PROCEDURE — 700102 HCHG RX REV CODE 250 W/ 637 OVERRIDE(OP): Performed by: HOSPITALIST

## 2017-12-12 PROCEDURE — 770020 HCHG ROOM/CARE - TELE (206)

## 2017-12-12 PROCEDURE — 87040 BLOOD CULTURE FOR BACTERIA: CPT | Mod: 91

## 2017-12-12 PROCEDURE — 87502 INFLUENZA DNA AMP PROBE: CPT

## 2017-12-12 PROCEDURE — 83880 ASSAY OF NATRIURETIC PEPTIDE: CPT

## 2017-12-12 PROCEDURE — 700117 HCHG RX CONTRAST REV CODE 255: Performed by: HOSPITALIST

## 2017-12-12 PROCEDURE — 99223 1ST HOSP IP/OBS HIGH 75: CPT | Mod: AI | Performed by: HOSPITALIST

## 2017-12-12 PROCEDURE — 85610 PROTHROMBIN TIME: CPT

## 2017-12-12 PROCEDURE — 85730 THROMBOPLASTIN TIME PARTIAL: CPT

## 2017-12-12 PROCEDURE — 71010 DX-CHEST-PORTABLE (1 VIEW): CPT

## 2017-12-12 PROCEDURE — 71275 CT ANGIOGRAPHY CHEST: CPT

## 2017-12-12 PROCEDURE — 84484 ASSAY OF TROPONIN QUANT: CPT

## 2017-12-12 PROCEDURE — 700111 HCHG RX REV CODE 636 W/ 250 OVERRIDE (IP): Performed by: HOSPITALIST

## 2017-12-12 PROCEDURE — 93005 ELECTROCARDIOGRAM TRACING: CPT | Performed by: EMERGENCY MEDICINE

## 2017-12-12 PROCEDURE — 36415 COLL VENOUS BLD VENIPUNCTURE: CPT

## 2017-12-12 PROCEDURE — 85379 FIBRIN DEGRADATION QUANT: CPT

## 2017-12-12 PROCEDURE — A9270 NON-COVERED ITEM OR SERVICE: HCPCS | Performed by: HOSPITALIST

## 2017-12-12 PROCEDURE — 304561 HCHG STAT O2

## 2017-12-12 PROCEDURE — 700105 HCHG RX REV CODE 258: Performed by: HOSPITALIST

## 2017-12-12 PROCEDURE — 85025 COMPLETE CBC W/AUTO DIFF WBC: CPT

## 2017-12-12 PROCEDURE — 700105 HCHG RX REV CODE 258: Performed by: EMERGENCY MEDICINE

## 2017-12-12 PROCEDURE — 81001 URINALYSIS AUTO W/SCOPE: CPT

## 2017-12-12 PROCEDURE — 83605 ASSAY OF LACTIC ACID: CPT | Mod: 91

## 2017-12-12 RX ORDER — ACETAMINOPHEN 325 MG/1
650 TABLET ORAL EVERY 6 HOURS PRN
COMMUNITY

## 2017-12-12 RX ORDER — OMEPRAZOLE 20 MG/1
20 CAPSULE, DELAYED RELEASE ORAL DAILY
Status: DISCONTINUED | OUTPATIENT
Start: 2017-12-13 | End: 2017-12-16 | Stop reason: HOSPADM

## 2017-12-12 RX ORDER — TAMSULOSIN HYDROCHLORIDE 0.4 MG/1
0.4 CAPSULE ORAL DAILY
Status: DISCONTINUED | OUTPATIENT
Start: 2017-12-13 | End: 2017-12-16 | Stop reason: HOSPADM

## 2017-12-12 RX ORDER — ACETAMINOPHEN 325 MG/1
650 TABLET ORAL EVERY 6 HOURS PRN
Status: DISCONTINUED | OUTPATIENT
Start: 2017-12-12 | End: 2017-12-16 | Stop reason: HOSPADM

## 2017-12-12 RX ORDER — BISACODYL 10 MG
10 SUPPOSITORY, RECTAL RECTAL ONCE
COMMUNITY

## 2017-12-12 RX ORDER — OXYCODONE HYDROCHLORIDE 5 MG/1
5 TABLET ORAL EVERY 4 HOURS PRN
COMMUNITY

## 2017-12-12 RX ORDER — BISACODYL 10 MG
10 SUPPOSITORY, RECTAL RECTAL
Status: DISCONTINUED | OUTPATIENT
Start: 2017-12-12 | End: 2017-12-16 | Stop reason: HOSPADM

## 2017-12-12 RX ORDER — SODIUM CHLORIDE 9 MG/ML
INJECTION, SOLUTION INTRAVENOUS CONTINUOUS
Status: DISCONTINUED | OUTPATIENT
Start: 2017-12-12 | End: 2017-12-16 | Stop reason: HOSPADM

## 2017-12-12 RX ORDER — OXYCODONE HYDROCHLORIDE 5 MG/1
5 TABLET ORAL EVERY 4 HOURS PRN
Status: DISCONTINUED | OUTPATIENT
Start: 2017-12-12 | End: 2017-12-16 | Stop reason: HOSPADM

## 2017-12-12 RX ORDER — TRAMADOL HYDROCHLORIDE 50 MG/1
50 TABLET ORAL EVERY 4 HOURS PRN
Status: ON HOLD | COMMUNITY
End: 2017-12-16

## 2017-12-12 RX ORDER — HEPARIN SODIUM 1000 [USP'U]/ML
2200 INJECTION, SOLUTION INTRAVENOUS; SUBCUTANEOUS PRN
Status: DISCONTINUED | OUTPATIENT
Start: 2017-12-12 | End: 2017-12-13

## 2017-12-12 RX ORDER — TIOTROPIUM BROMIDE 18 UG/1
1 CAPSULE ORAL; RESPIRATORY (INHALATION) DAILY
Status: DISCONTINUED | OUTPATIENT
Start: 2017-12-12 | End: 2017-12-15

## 2017-12-12 RX ORDER — DOCUSATE SODIUM 100 MG/1
100 CAPSULE, LIQUID FILLED ORAL 2 TIMES DAILY
Status: ON HOLD | COMMUNITY
End: 2017-12-16

## 2017-12-12 RX ORDER — POLYETHYLENE GLYCOL 3350 17 G/17G
1 POWDER, FOR SOLUTION ORAL
Status: DISCONTINUED | OUTPATIENT
Start: 2017-12-12 | End: 2017-12-16 | Stop reason: HOSPADM

## 2017-12-12 RX ORDER — ONDANSETRON 4 MG/1
4 TABLET, ORALLY DISINTEGRATING ORAL EVERY 4 HOURS PRN
COMMUNITY

## 2017-12-12 RX ORDER — TRAMADOL HYDROCHLORIDE 50 MG/1
50 TABLET ORAL EVERY 4 HOURS PRN
Status: DISCONTINUED | OUTPATIENT
Start: 2017-12-12 | End: 2017-12-16 | Stop reason: HOSPADM

## 2017-12-12 RX ORDER — AMOXICILLIN 250 MG
2 CAPSULE ORAL 2 TIMES DAILY
Status: DISCONTINUED | OUTPATIENT
Start: 2017-12-12 | End: 2017-12-16 | Stop reason: HOSPADM

## 2017-12-12 RX ORDER — SODIUM CHLORIDE 9 MG/ML
30 INJECTION, SOLUTION INTRAVENOUS
Status: COMPLETED | OUTPATIENT
Start: 2017-12-12 | End: 2017-12-12

## 2017-12-12 RX ORDER — HEPARIN SODIUM 1000 [USP'U]/ML
4000 INJECTION, SOLUTION INTRAVENOUS; SUBCUTANEOUS ONCE
Status: COMPLETED | OUTPATIENT
Start: 2017-12-12 | End: 2017-12-12

## 2017-12-12 RX ORDER — OMEPRAZOLE 20 MG/1
20 CAPSULE, DELAYED RELEASE ORAL DAILY
Status: ON HOLD | COMMUNITY
End: 2017-12-16

## 2017-12-12 RX ORDER — BUDESONIDE AND FORMOTEROL FUMARATE DIHYDRATE 160; 4.5 UG/1; UG/1
2 AEROSOL RESPIRATORY (INHALATION) 2 TIMES DAILY
Status: DISCONTINUED | OUTPATIENT
Start: 2017-12-12 | End: 2017-12-16 | Stop reason: HOSPADM

## 2017-12-12 RX ADMIN — IOHEXOL 80 ML: 350 INJECTION, SOLUTION INTRAVENOUS at 19:45

## 2017-12-12 RX ADMIN — TIOTROPIUM BROMIDE 1 CAPSULE: 18 CAPSULE ORAL; RESPIRATORY (INHALATION) at 21:42

## 2017-12-12 RX ADMIN — HEPARIN SODIUM 900 UNITS/HR: 5000 INJECTION, SOLUTION INTRAVENOUS at 19:31

## 2017-12-12 RX ADMIN — HEPARIN SODIUM 4000 UNITS: 1000 INJECTION, SOLUTION INTRAVENOUS; SUBCUTANEOUS at 19:25

## 2017-12-12 RX ADMIN — BUDESONIDE AND FORMOTEROL FUMARATE DIHYDRATE 2 PUFF: 160; 4.5 AEROSOL RESPIRATORY (INHALATION) at 21:42

## 2017-12-12 RX ADMIN — SODIUM CHLORIDE: 9 INJECTION, SOLUTION INTRAVENOUS at 17:47

## 2017-12-12 RX ADMIN — ACETAMINOPHEN 650 MG: 325 TABLET, FILM COATED ORAL at 21:42

## 2017-12-12 RX ADMIN — SODIUM CHLORIDE 1770 ML: 9 INJECTION, SOLUTION INTRAVENOUS at 13:30

## 2017-12-12 RX ADMIN — ENOXAPARIN SODIUM 40 MG: 100 INJECTION SUBCUTANEOUS at 17:46

## 2017-12-12 ASSESSMENT — LIFESTYLE VARIABLES
HAVE PEOPLE ANNOYED YOU BY CRITICIZING YOUR DRINKING: NO
EVER FELT BAD OR GUILTY ABOUT YOUR DRINKING: NO
EVER HAD A DRINK FIRST THING IN THE MORNING TO STEADY YOUR NERVES TO GET RID OF A HANGOVER: NO
HAVE YOU EVER FELT YOU SHOULD CUT DOWN ON YOUR DRINKING: NO
DO YOU DRINK ALCOHOL: YES
TOTAL SCORE: 0
DO YOU DRINK ALCOHOL: NO
EVER_SMOKED: YES
TOTAL SCORE: 0
EVER_SMOKED: YES
CONSUMPTION TOTAL: NEGATIVE
ON A TYPICAL DAY WHEN YOU DRINK ALCOHOL HOW MANY DRINKS DO YOU HAVE: 1
AVERAGE NUMBER OF DAYS PER WEEK YOU HAVE A DRINK CONTAINING ALCOHOL: 0
TOTAL SCORE: 0
HOW MANY TIMES IN THE PAST YEAR HAVE YOU HAD 5 OR MORE DRINKS IN A DAY: 0

## 2017-12-12 ASSESSMENT — PATIENT HEALTH QUESTIONNAIRE - PHQ9
2. FEELING DOWN, DEPRESSED, IRRITABLE, OR HOPELESS: NOT AT ALL
SUM OF ALL RESPONSES TO PHQ9 QUESTIONS 1 AND 2: 0
SUM OF ALL RESPONSES TO PHQ QUESTIONS 1-9: 0
1. LITTLE INTEREST OR PLEASURE IN DOING THINGS: NOT AT ALL

## 2017-12-12 ASSESSMENT — PAIN SCALES - GENERAL
PAINLEVEL_OUTOF10: 0
PAINLEVEL_OUTOF10: 0

## 2017-12-12 ASSESSMENT — COPD QUESTIONNAIRES
DO YOU EVER COUGH UP ANY MUCUS OR PHLEGM?: NO/ONLY WITH OCCASIONAL COLDS OR INFECTIONS
HAVE YOU SMOKED AT LEAST 100 CIGARETTES IN YOUR ENTIRE LIFE: YES
COPD SCREENING SCORE: 4
DURING THE PAST 4 WEEKS HOW MUCH DID YOU FEEL SHORT OF BREATH: NONE/LITTLE OF THE TIME

## 2017-12-12 NOTE — ED NOTES
Rashaun from Lab called with critical result of Trop 2.7 at 1455. Critical lab result read back to Rashaun.   Dr. Gansert notified of critical lab result at 1305.  Critical lab result read back by Dr. Gansert.  EKG complete. Flu swab to lab. 2nd set BC to lab. Planning for admission. Pt and family updated to POC.

## 2017-12-12 NOTE — ED PROVIDER NOTES
"ED Provider Note    CHIEF COMPLAINT  Chief Complaint   Patient presents with   • Hypotension     xdays, no improvement w/ fluids   • Shortness of Breath     RA O2 86%, pt able to speak in complete sentences, NAD       HPI  Jignesh Dumont is a 83 y.o. male who presents For evaluation of hypotension.  The patient was recently admitted to the hospital for medical treatment and transfer to a skilled nursing facility.  The patient has had a recent left ankle fracture.  The patient was noted to be hypotensive at the nursing home and sent to the ED for evaluation.  Today, the patient states he \"feels fine\".  He denies: Headache, visual disturbance, chest pain, shortness breath, abdominal pain, nausea, vomiting, hematemesis, melena hematochezia, hematuria, dysuria.  The patient apparently was having quite a bit of pain yesterday and the daughter indicates that he was confused and weak, but feels that he looks much better now.  No other acute symptomatology or complaints.    REVIEW OF SYSTEMS  See HPI for further details. All other systems negative.    PAST MEDICAL HISTORY  Past Medical History:   Diagnosis Date   • BPH    • COPD (chronic obstructive pulmonary disease) (CMS-HCC)    • Polio    • Rheumatic fever        FAMILY HISTORY  No family history on file.    SOCIAL HISTORY  Previous tobacco use; occasional alcohol use;    SURGICAL HISTORY  History reviewed. No pertinent surgical history.    CURRENT MEDICATIONS  See nurses notes    ALLERGIES  Allergies   Allergen Reactions   • Aspirin Shortness of Breath       PHYSICAL EXAM  VITAL SIGNS: BP (!) 76/41   Pulse 79   Temp 36.8 °C (98.2 °F)   Resp 15   Ht 1.778 m (5' 10\")   Wt 59 kg (130 lb)   BMI 18.65 kg/m²    Constitutional: A 83-year-old male, thin, cachectic, weak appearing, oriented ×3  HENT: ,Atraumatic, Bilateral external ears normal, tympanic membranes clear, Oropharynx mildly dry, No oral exudates, Nose normal.   Eyes: PERRL, EOMI, Conjunctiva normal, No " discharge.   Neck: Normal range of motion, No tenderness, Supple, No stridor.   Lymphatic: No lymphadenopathy noted.   Cardiovascular: Normal heart rate, Normal rhythm, No murmurs, No rubs, No gallops.   Thorax & Lungs: Mild bilateral rhonchi, No respiratory distress, No wheezing, no stridor, no rales. No chest tenderness.   Abdomen: Soft, nontender, nondistended, no organomegaly, positive bowel sounds normal in quality. No guarding or rebound.  Skin: Decreased skin turgor, pink, warm, dry. No rashes, petechiae, purpura. Normal capillary refill.   Back: No tenderness, No CVA tenderness.   Extremities: Intact distal pulses, No edema, No tenderness, No cyanosis, No clubbing. Vascular: Pulses are 2+, symmetric in the upper and lower extremities.  Musculoskeletal: Diffuse arthritic changes but diffuse muscular atrophy.  Patient has a cast on the left lower leg; normal capillary refill;   Neurologic: Weak appearing, oriented x 3, Normal motor function, Normal sensory function, No gross focal deficits noted.   Psychiatric: Affect normal, Judgment normal, Mood normal.     EKG  I have interpreted: Rate 98, rhythm sinus, underlying artifactual baseline, left axis deviation, no acute STEMI, 12-lead EKG, no old tracing for comparison;    RADIOLOGY/PROCEDURES  DX-CHEST-PORTABLE (1 VIEW)   Final Result      1.  Pulmonary vascular congestion and probable minimal edema.   2.  Mild LEFT lung base atelectasis and probable small effusion.   3.  No definite pneumonia or pneumothorax.            COURSE & MEDICAL DECISION MAKING  Pertinent Labs & Imaging studies reviewed. (See chart for details)  1.  Monitor  2.  IV normal saline: Sepsis protocol  3.  O2    Laboratory studies: CBC shows white count 7.6, 80% neutrophils, 6% lymphocytes, 4% monocytes, hemoglobin 14.3, crit 43.0; coags within normal; ; troponin 2.37; Rapid influenza is negative; urinalysis positive for protein but negative for nitrite and leukocyte esterase; CMP has  been ordered but has been delayed and will be reviewed    Discussion/consultation: At this time, the patient presents for evaluation of hypotension.  The patient appears dehydrated.  Patient is asymptomatic and has an elevated troponin.  EKG does not show evidence of STEMI.  Treatment was initiated as noted above.  I spoke with the hospitalist on call.  The patient will be admitted for further monitoring, treatment, and care.    FINAL IMPRESSION  1. Hypotension, unspecified hypotension type    2. Elevated troponin           PLAN  1.  The patient will be admitted for further monitoring, treatment, and care.    Electronically signed by: Guy G Gansert, 12/12/2017 1:34 PM

## 2017-12-12 NOTE — ED NOTES
Med rec complete per MAR  Allergies reviewed    Patient is on Bactrim but doesn't say why    On Lovenox for 7 days for DVT prophylaxis, started 12-6-17

## 2017-12-12 NOTE — ED NOTES
Pt bib EMS from [facility].  Chief Complaint   Patient presents with   • Hypotension     xdays, no improvement w/ fluids   • Shortness of Breath     RA O2 86%, pt able to speak in complete sentences, NAD     Pt is at skilled for a l ankle fracture.  Pt in a gown. Connected to monitor, chart up for ERP.  Pt currently denies complaint.

## 2017-12-13 PROBLEM — J44.9 COPD (CHRONIC OBSTRUCTIVE PULMONARY DISEASE) (HCC): Status: ACTIVE | Noted: 2017-12-13

## 2017-12-13 PROBLEM — J18.9 PNEUMONIA: Status: ACTIVE | Noted: 2017-12-13

## 2017-12-13 LAB
ANION GAP SERPL CALC-SCNC: 11 MMOL/L (ref 0–11.9)
APTT PPP: 34.7 SEC (ref 24.7–36)
APTT PPP: 74.1 SEC (ref 24.7–36)
BUN SERPL-MCNC: 32 MG/DL (ref 8–22)
CALCIUM SERPL-MCNC: 8 MG/DL (ref 8.5–10.5)
CHLORIDE SERPL-SCNC: 109 MMOL/L (ref 96–112)
CHOLEST SERPL-MCNC: 84 MG/DL (ref 100–199)
CO2 SERPL-SCNC: 17 MMOL/L (ref 20–33)
CORTIS SERPL-MCNC: 17.8 UG/DL (ref 0–23)
CREAT SERPL-MCNC: 0.8 MG/DL (ref 0.5–1.4)
ERYTHROCYTE [DISTWIDTH] IN BLOOD BY AUTOMATED COUNT: 46.3 FL (ref 35.9–50)
EST. AVERAGE GLUCOSE BLD GHB EST-MCNC: 123 MG/DL
GFR SERPL CREATININE-BSD FRML MDRD: >60 ML/MIN/1.73 M 2
GLUCOSE SERPL-MCNC: 79 MG/DL (ref 65–99)
HBA1C MFR BLD: 5.9 % (ref 0–5.6)
HCT VFR BLD AUTO: 40.7 % (ref 42–52)
HDLC SERPL-MCNC: 29 MG/DL
HGB BLD-MCNC: 13.3 G/DL (ref 14–18)
LDLC SERPL CALC-MCNC: 40 MG/DL
MAGNESIUM SERPL-MCNC: 2 MG/DL (ref 1.5–2.5)
MCH RBC QN AUTO: 30.2 PG (ref 27–33)
MCHC RBC AUTO-ENTMCNC: 32.7 G/DL (ref 33.7–35.3)
MCV RBC AUTO: 92.5 FL (ref 81.4–97.8)
PLATELET # BLD AUTO: 273 K/UL (ref 164–446)
PMV BLD AUTO: 9.7 FL (ref 9–12.9)
POTASSIUM SERPL-SCNC: 3.7 MMOL/L (ref 3.6–5.5)
PROCALCITONIN SERPL-MCNC: 0.71 NG/ML
RBC # BLD AUTO: 4.4 M/UL (ref 4.7–6.1)
SODIUM SERPL-SCNC: 137 MMOL/L (ref 135–145)
TRIGL SERPL-MCNC: 73 MG/DL (ref 0–149)
TROPONIN I SERPL-MCNC: 0.94 NG/ML (ref 0–0.04)
TROPONIN I SERPL-MCNC: 1.14 NG/ML (ref 0–0.04)
TROPONIN I SERPL-MCNC: 1.35 NG/ML (ref 0–0.04)
TSH SERPL DL<=0.005 MIU/L-ACNC: 1.8 UIU/ML (ref 0.3–3.7)
WBC # BLD AUTO: 7.3 K/UL (ref 4.8–10.8)

## 2017-12-13 PROCEDURE — 83036 HEMOGLOBIN GLYCOSYLATED A1C: CPT

## 2017-12-13 PROCEDURE — 80061 LIPID PANEL: CPT

## 2017-12-13 PROCEDURE — 700111 HCHG RX REV CODE 636 W/ 250 OVERRIDE (IP): Performed by: HOSPITALIST

## 2017-12-13 PROCEDURE — 85730 THROMBOPLASTIN TIME PARTIAL: CPT

## 2017-12-13 PROCEDURE — 700105 HCHG RX REV CODE 258: Performed by: HOSPITALIST

## 2017-12-13 PROCEDURE — A9270 NON-COVERED ITEM OR SERVICE: HCPCS | Performed by: INTERNAL MEDICINE

## 2017-12-13 PROCEDURE — 93306 TTE W/DOPPLER COMPLETE: CPT | Mod: 26 | Performed by: INTERNAL MEDICINE

## 2017-12-13 PROCEDURE — 84443 ASSAY THYROID STIM HORMONE: CPT

## 2017-12-13 PROCEDURE — 700102 HCHG RX REV CODE 250 W/ 637 OVERRIDE(OP): Performed by: HOSPITALIST

## 2017-12-13 PROCEDURE — 700102 HCHG RX REV CODE 250 W/ 637 OVERRIDE(OP): Performed by: INTERNAL MEDICINE

## 2017-12-13 PROCEDURE — 83735 ASSAY OF MAGNESIUM: CPT

## 2017-12-13 PROCEDURE — 770020 HCHG ROOM/CARE - TELE (206)

## 2017-12-13 PROCEDURE — 36415 COLL VENOUS BLD VENIPUNCTURE: CPT

## 2017-12-13 PROCEDURE — 84145 PROCALCITONIN (PCT): CPT

## 2017-12-13 PROCEDURE — 85027 COMPLETE CBC AUTOMATED: CPT

## 2017-12-13 PROCEDURE — 84484 ASSAY OF TROPONIN QUANT: CPT

## 2017-12-13 PROCEDURE — 99232 SBSQ HOSP IP/OBS MODERATE 35: CPT | Performed by: HOSPITALIST

## 2017-12-13 PROCEDURE — 93306 TTE W/DOPPLER COMPLETE: CPT

## 2017-12-13 PROCEDURE — 87040 BLOOD CULTURE FOR BACTERIA: CPT

## 2017-12-13 PROCEDURE — A9270 NON-COVERED ITEM OR SERVICE: HCPCS | Performed by: HOSPITALIST

## 2017-12-13 PROCEDURE — 700105 HCHG RX REV CODE 258: Performed by: INTERNAL MEDICINE

## 2017-12-13 PROCEDURE — 82533 TOTAL CORTISOL: CPT

## 2017-12-13 PROCEDURE — 80048 BASIC METABOLIC PNL TOTAL CA: CPT

## 2017-12-13 PROCEDURE — 302255 BARRIER CREAM MOISTURE BAZA PROTECT (ZINC) 5OZ: Performed by: HOSPITALIST

## 2017-12-13 RX ORDER — HEPARIN SODIUM 5000 [USP'U]/ML
5000 INJECTION, SOLUTION INTRAVENOUS; SUBCUTANEOUS EVERY 8 HOURS
Status: DISCONTINUED | OUTPATIENT
Start: 2017-12-13 | End: 2017-12-16 | Stop reason: HOSPADM

## 2017-12-13 RX ORDER — SODIUM CHLORIDE 9 MG/ML
500 INJECTION, SOLUTION INTRAVENOUS ONCE
Status: COMPLETED | OUTPATIENT
Start: 2017-12-13 | End: 2017-12-13

## 2017-12-13 RX ORDER — DOXYCYCLINE 100 MG/1
100 TABLET ORAL EVERY 12 HOURS
Status: DISCONTINUED | OUTPATIENT
Start: 2017-12-13 | End: 2017-12-16 | Stop reason: HOSPADM

## 2017-12-13 RX ADMIN — SODIUM CHLORIDE 500 ML: 9 INJECTION, SOLUTION INTRAVENOUS at 04:13

## 2017-12-13 RX ADMIN — TAMSULOSIN HYDROCHLORIDE 0.4 MG: 0.4 CAPSULE ORAL at 08:59

## 2017-12-13 RX ADMIN — OMEPRAZOLE 20 MG: 20 CAPSULE, DELAYED RELEASE ORAL at 09:00

## 2017-12-13 RX ADMIN — CEFTRIAXONE 2 G: 2 INJECTION, POWDER, FOR SOLUTION INTRAMUSCULAR; INTRAVENOUS at 19:40

## 2017-12-13 RX ADMIN — DOXYCYCLINE 100 MG: 100 TABLET ORAL at 02:35

## 2017-12-13 RX ADMIN — BUDESONIDE AND FORMOTEROL FUMARATE DIHYDRATE 2 PUFF: 160; 4.5 AEROSOL RESPIRATORY (INHALATION) at 09:00

## 2017-12-13 RX ADMIN — VITAMIN D, TAB 1000IU (100/BT) 2000 UNITS: 25 TAB at 08:59

## 2017-12-13 RX ADMIN — CEFTRIAXONE 2 G: 2 INJECTION, POWDER, FOR SOLUTION INTRAMUSCULAR; INTRAVENOUS at 01:22

## 2017-12-13 RX ADMIN — BUDESONIDE AND FORMOTEROL FUMARATE DIHYDRATE 2 PUFF: 160; 4.5 AEROSOL RESPIRATORY (INHALATION) at 19:35

## 2017-12-13 RX ADMIN — TIOTROPIUM BROMIDE 1 CAPSULE: 18 CAPSULE ORAL; RESPIRATORY (INHALATION) at 09:00

## 2017-12-13 RX ADMIN — DOXYCYCLINE 100 MG: 100 TABLET ORAL at 19:35

## 2017-12-13 RX ADMIN — DOXYCYCLINE 100 MG: 100 TABLET ORAL at 08:59

## 2017-12-13 RX ADMIN — HEPARIN SODIUM 5000 UNITS: 5000 INJECTION, SOLUTION INTRAVENOUS; SUBCUTANEOUS at 22:00

## 2017-12-13 RX ADMIN — HEPARIN SODIUM 5000 UNITS: 5000 INJECTION, SOLUTION INTRAVENOUS; SUBCUTANEOUS at 14:22

## 2017-12-13 RX ADMIN — SODIUM CHLORIDE: 9 INJECTION, SOLUTION INTRAVENOUS at 19:36

## 2017-12-13 RX ADMIN — THERA TABS 1 TABLET: TAB at 08:59

## 2017-12-13 RX ADMIN — STANDARDIZED SENNA CONCENTRATE AND DOCUSATE SODIUM 2 TABLET: 8.6; 5 TABLET, FILM COATED ORAL at 08:59

## 2017-12-13 ASSESSMENT — ENCOUNTER SYMPTOMS
SENSORY CHANGE: 0
DOUBLE VISION: 0
DEPRESSION: 0
SPUTUM PRODUCTION: 0
SHORTNESS OF BREATH: 0
BLOOD IN STOOL: 0
CONSTITUTIONAL NEGATIVE: 1
CONSTIPATION: 0
SPUTUM PRODUCTION: 1
MEMORY LOSS: 0
SPEECH CHANGE: 0
MUSCULOSKELETAL NEGATIVE: 1
PSYCHIATRIC NEGATIVE: 1
HEADACHES: 0
ORTHOPNEA: 0
ABDOMINAL PAIN: 0
COUGH: 0
NECK PAIN: 0
TREMORS: 0
DIZZINESS: 0
STRIDOR: 0
SORE THROAT: 0
PND: 0
EYE PAIN: 0
HEARTBURN: 0
CHILLS: 0
NAUSEA: 0
WEIGHT LOSS: 0
RESPIRATORY NEGATIVE: 1
TINGLING: 0
CLAUDICATION: 0
PALPITATIONS: 0
BACK PAIN: 0
EYES NEGATIVE: 1
CARDIOVASCULAR NEGATIVE: 1
NERVOUS/ANXIOUS: 0
COUGH: 1
VOMITING: 0
FEVER: 0
GASTROINTESTINAL NEGATIVE: 1
NEUROLOGICAL NEGATIVE: 1
PHOTOPHOBIA: 0
BLURRED VISION: 0
WEAKNESS: 1
MYALGIAS: 0
HEMOPTYSIS: 0

## 2017-12-13 ASSESSMENT — PAIN SCALES - GENERAL
PAINLEVEL_OUTOF10: 0

## 2017-12-13 NOTE — PROGRESS NOTES
1950- Patient heparin drip initiated and verified with another RN. Patient is resting with eyes closed, easily awakened, is oriented to self, place, and situation. Family stated he does have some forgetfulness at time. Cast noted to left leg, daughter states patient fell at home and fractured ankle. Patient has belongings within reach, instructed on how to use call light for assistance. Patient verbalized understanding    [id number]-Notified md patient has jenny bp of 80/42. Is in SR. No chest pain and trops are trending down. MD gave this RN parameters for flomax, received order for 500 ml bolus, bolus initiated, patient lungs clear, he is on o2, denies SOB. Patient remains q2h turn

## 2017-12-13 NOTE — PROGRESS NOTES
Cardiology Progress Note               Author: Trice Whitley Date & Time created: 2017  11:20 AM     Interval History:  84 y/o male with known history of BPH, polio leading to residual right lower extremity weakness, rheumatic fever as a child recent ankle fracture was currently residing in a SNF was sent from nursing facility for evaluation of hypotension. According to patient's son patient was not himself yesterday and due to low blood pressure he was sent over here for further evaluation.     : feels better, cachetic appearance, low O2 requirements today. PT/OT, family at bedside    Telemetry: sinus rhythm 74-94, PAC's    Review of Systems   Constitutional: Negative for fever and malaise/fatigue.   Respiratory: Negative for cough and shortness of breath.    Cardiovascular: Negative for chest pain, palpitations, orthopnea, claudication, leg swelling and PND.   Gastrointestinal: Negative for abdominal pain.   Musculoskeletal: Negative for myalgias.   Neurological: Positive for weakness. Negative for dizziness.   All other systems reviewed and are negative.      Physical Exam   Constitutional: He is oriented to person, place, and time. He appears well-developed and well-nourished. No distress.   HENT:   Head: Normocephalic and atraumatic.   Eyes: EOM are normal.   Neck: Normal range of motion. No JVD present.   Cardiovascular: Normal rate, regular rhythm, normal heart sounds and intact distal pulses.    No murmur heard.  Pulmonary/Chest: Effort normal and breath sounds normal. No respiratory distress. He has no wheezes. He has no rales.   Abdominal: Soft. Bowel sounds are normal.   Musculoskeletal: He exhibits no edema.   R ankle with soft cast   Neurological: He is alert and oriented to person, place, and time.   Skin: Skin is warm and dry.   Psychiatric: He has a normal mood and affect.   Nursing note and vitals reviewed.      Hemodynamics:  Temp (24hrs), Av.7 °C (99.9 °F), Min:36.8 °C (98.2  °F), Max:38.6 °C (101.5 °F)  Temperature: 38 °C (100.4 °F)  Pulse  Av.8  Min: 74  Max: 92Heart Rate (Monitored): 88  Blood Pressure : 124/61, NIBP: 105/49     Respiratory:    Respiration: 17, Pulse Oximetry: 92 %           Fluids:     Weight: 59 kg (130 lb)  GI/Nutrition:  Orders Placed This Encounter   Procedures   • Diet Order     Standing Status:   Standing     Number of Occurrences:   1     Order Specific Question:   Diet:     Answer:   Cardiac [6]     Lab Results:  Recent Labs      17   0310  17   1325  17   0214   WBC  9.1  7.6  7.3   RBC  5.29  4.64*  4.40*   HEMOGLOBIN  16.2  14.3  13.3*   HEMATOCRIT  50.0  43.0  40.7*   MCV  94.5  92.7  92.5   MCH  30.6  30.8  30.2   MCHC  32.4*  33.3*  32.7*   RDW  46.8  46.5  46.3   PLATELETCT  367  276  273   MPV  9.5  9.8  9.7     Recent Labs      17   0310  17   1606  17   0214   SODIUM  133*  136  137   POTASSIUM  4.5  4.2  3.7   CHLORIDE  98  107  109   CO2  24  22  17*   GLUCOSE  103*  94  79   BUN  19  34*  32*   CREATININE  0.86  0.90  0.80   CALCIUM  9.4  8.2*  8.0*     Recent Labs      17   1325  17   0214  17   0831   APTT  37.7*  74.1*  34.7   INR  1.16*   --    --      Recent Labs      17   1325   BNPBTYPENAT  357*     Recent Labs      17   1325  17   0214  17   0536  17   0831   TROPONINI  2.37*  1.35*  1.14*  0.94*   BNPBTYPENAT  357*   --    --    --      Recent Labs      17   0214   TRIGLYCERIDE  73   HDL  29*   LDL  40         Medical Decision Making, by Problem:  Active Hospital Problems    Diagnosis   • Failure to thrive in adult [R62.7]   • COPD (chronic obstructive pulmonary disease) (CMS-HCC) [J44.9]   • Pneumonia [J18.9]   • Elevated troponin [R74.8]     Plan:  1. Failure to thrive  -needs dietary consult for protein malnourishment  -RN to place order    2. COPD  -managed by hosp team, low O2 requirements    3. Questionable pneumonia on CXR  -  prophylactic ABX per hosp team  -BP low last night, good response to fluid bolus     4. Elevated troponin  -trop trending down, no CP overnight or today  -offered nuclear imaging for ischemic workup, family and patient to discuss and RN will call myself with decision  -low threshold for cardiac cath  -heparin gtt stopped with no PE on CT; d dimer elevated, ok for lovenox due to bedrest  -echo pending results    We will follow alongside you in the care of this patient.       Reviewed items::  EKG reviewed, Radiology images reviewed, Medications reviewed and Labs reviewed  Bedolla catheter::  No Bedolla  DVT prophylaxis pharmacological::  Not indicated at this time, ambulatory and Enoxaparin (Lovenox)  DVT prophylaxis - mechanical:  SCDs  Ulcer Prophylaxis::  Yes

## 2017-12-13 NOTE — RESPIRATORY CARE
COPD EDUCATION by COPD CLINICAL EDUCATOR  12/13/2017 at 6:40 AM by Aria White     Patient reviewed by COPD education team. Patient does not qualify for COPD program at this time

## 2017-12-13 NOTE — PROGRESS NOTES
2 RN skin assessment completed, redness to ears elbows and coccyx noted but blanching.  RLE with redness and excoriation noted and LLE with cast in place frm previous ankle fracture.

## 2017-12-13 NOTE — PROGRESS NOTES
Cardiology Progress Note               Author: Vika Robles Date & Time created: 2017  11:18 AM     Interval History:   patient denied any complaints of chest pain pressure or tightness. No dyspnea.    Telemetry: Sinus rhythm    Review of Systems   Constitutional: Negative.    Eyes: Negative.    Respiratory: Negative.    Cardiovascular: Negative.    Gastrointestinal: Negative.    Genitourinary: Negative.    Musculoskeletal: Negative.         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    Neurological: Negative.    Psychiatric/Behavioral: Negative.        Physical Exam   Constitutional: He is oriented to person, place, and time.   Elderly frail   HENT:   Head: Normocephalic and atraumatic.   Mouth/Throat: No oropharyngeal exudate.   Eyes: Conjunctivae are normal. Pupils are equal, round, and reactive to light. Right eye exhibits no discharge. Left eye exhibits no discharge.   Neck: No JVD present. No tracheal deviation present.   Cardiovascular: Regular rhythm.    No murmur heard.  Pulmonary/Chest: Effort normal and breath sounds normal. No respiratory distress. He has no wheezes. He has no rales.   Abdominal: Soft. Bowel sounds are normal. He exhibits no distension. There is no tenderness. There is no rebound.   Musculoskeletal: Normal range of motion. He exhibits no edema.   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   Skin: Skin is warm and dry. No erythema.   Psychiatric: He has a normal mood and affect.       Hemodynamics:  Temp (24hrs), Av.7 °C (99.9 °F), Min:36.8 °C (98.2 °F), Max:38.6 °C (101.5 °F)  Temperature: 38 °C (100.4 °F)  Pulse  Av.8  Min: 74  Max: 92Heart Rate (Monitored): 88  Blood Pressure : 124/61, NIBP: 105/49      Respiratory:    Respiration: 17, Pulse Oximetry: 92 %           Fluids:     Weight: 59 kg (130 lb)  GI/Nutrition:  Orders Placed This Encounter   Procedures   • Diet Order     Standing Status:   Standing     Number of Occurrences:   1     Order Specific Question:   Diet:     Answer:   Cardiac [6]     Lab Results:  Recent Labs      12/11/17   0310  12/12/17   1325  12/13/17   0214   WBC  9.1  7.6  7.3   RBC  5.29  4.64*  4.40*   HEMOGLOBIN  16.2  14.3  13.3*   HEMATOCRIT  50.0  43.0  40.7*   MCV  94.5  92.7  92.5   MCH  30.6  30.8  30.2   MCHC  32.4*  33.3*  32.7*   RDW  46.8  46.5  46.3   PLATELETCT  367  276  273   MPV  9.5  9.8  9.7     Recent Labs      12/11/17   0310  12/12/17   1606  12/13/17   0214   SODIUM  133*  136  137   POTASSIUM  4.5  4.2  3.7   CHLORIDE  98  107  109   CO2  24  22  17*   GLUCOSE  103*  94  79   BUN  19  34*  32*   CREATININE  0.86  0.90  0.80   CALCIUM  9.4  8.2*  8.0*     Recent Labs      12/12/17   1325  12/13/17   0214  12/13/17   0831   APTT  37.7*  74.1*  34.7   INR  1.16*   --    --      Recent Labs      12/12/17   1325   BNPBTYPENAT  357*     Recent Labs      12/12/17   1325  12/13/17   0214  12/13/17   0536  12/13/17   0831   TROPONINI  2.37*  1.35*  1.14*  0.94*   BNPBTYPENAT  357*   --    --    --      Recent Labs      12/13/17   0214   TRIGLYCERIDE  73   HDL  29*   LDL  40   CT PE study: 12/12/17  1. No CT evidence of pulmonary embolism.  2. Small bilateral pleural effusions.  3. Patchy opacities in the bilateral lung bases, left more than right, atelectasis versus consolidation    Echo images personally reviewed by me which shows normal LV function as well as wall motion.    Chest Xray:  Reviewed  1.  Pulmonary vascular congestion and probable minimal edema.  2.  Mild LEFT lung base atelectasis and probable small effusion.  3.  No definite pneumonia or pneumothorax    Medical Decision Making, by Problem:  Active Hospital Problems    Diagnosis   • Failure to thrive in adult  [R62.7]   • COPD (chronic obstructive pulmonary disease) (CMS-[facility]) [J44.9]   • Pneumonia [J18.9]   • Elevated troponin [R74.8]       Plan:  Patient is a pleasant 83-year-old male with known history of BPH, polio leading to residual right lower extremity weakness, rheumatic fever as a child recent ankle fracture was currently residing in a SNF was sent from nursing facility for evaluation of hypotension. According to patient's son patient was not himself yesterday and due to low blood pressure he was sent over here for further evaluation.      Elevated troponin:  CT PE study is negative.  Okay to discontinue anticoagulation as troponins are trending down.  Echo shows reasonably normal wall motion. Will discuss with family members possible nuclear stress test rather than an angiogram. If stress test is abnormal then will schedule him for an angiogram.  Interim start aspirin 81 mg daily    Pneumonia  Continue antibiotics per primary team    Thank you for allowing me to participate in taking care of patient.     Vika Cornell MD    Quality-Core Measures

## 2017-12-13 NOTE — ASSESSMENT & PLAN NOTE
Denies chest pain.  Possibly 2/2 to demand ischemia?  CTA PE neg but does show possible pneumonia  troponin trending down.  D/c heparin gtt as CTA PE neg for PE.  Echocardiogram pending  Cardiology following.

## 2017-12-13 NOTE — ASSESSMENT & PLAN NOTE
Not in Acute exacerbation.  Continue RT protocol, duo nebs, Pep therapy if warranted, and incentive spirometry.   Wean oxygen as tolerated

## 2017-12-13 NOTE — H&P
Hospital Medicine History and Physical    Date of Service  12/12/2017    Chief Complaint  Chief Complaint   Patient presents with   • Hypotension     xdays, no improvement w/ fluids   • Shortness of Breath     RA O2 86%, pt able to speak in complete sentences, NAD       History of Presenting Illness  83 y.o. male who presented 12/12/2017 with pmh of copd, polio, rheumatic fever who is at SNF after left ankle fracture is coming today due to hypotension at nursing home, patient was found to have low bp today he is asymptomatic he received ivf in the ER and his bp is improved now, in the ER he was found to have elevated troponin but he denies any chest pain, sob, no palpitation, no dizziness, no lightheadedness, patient's ekg did not show acute ischemic changes, patient denies any flu like symptoms, fever cough, he has h/o copd but he does not wears o2 at home and is not having wheezing, patient has no rash, patient had elevated d dimer he went for CTA to assess for PE which is negative but ct showed possible pneumonia patient is now having fevers but no leukocytosis lactic acid was normal on admission, he will be started to iv atb for HCAP, cardiologist was consulted due to elevated troponin and plan to get ECHO, patient is alert and oriented, family at bedside, all questions answered.     Primary Care Physician  Andrey Villanueva M.D.    Consultants  cardiologist    Code Status  Full code    Review of Systems  Review of Systems   Constitutional: Negative for malaise/fatigue and weight loss.   HENT: Negative for ear discharge and ear pain.    Eyes: Negative for blurred vision and double vision.   Respiratory: Negative for sputum production and shortness of breath.    Cardiovascular: Negative for orthopnea and claudication.   Gastrointestinal: Negative for nausea and vomiting.   Genitourinary: Negative for frequency and urgency.   Musculoskeletal: Negative for back pain and neck pain.   Skin: Negative for itching.    Neurological: Negative for tingling and headaches.   Endo/Heme/Allergies: Negative for environmental allergies.   Psychiatric/Behavioral: Negative for depression.          Past Medical History  Past Medical History:   Diagnosis Date   • BPH    • COPD (chronic obstructive pulmonary disease) (CMS-[facility])    • Polio    • Rheumatic fever        Surgical History    Recent left ankle fracture.   No recent surgeries.     Medications  No current facility-administered medications on file prior to encounter.      Current Outpatient Prescriptions on File Prior to Encounter   Medication Sig Dispense Refill   • enoxaparin (LOVENOX) 40 MG/0.4ML Solution inj Inject 40 mg as instructed every day.     • tamsulosin (FLOMAX) 0.4 MG capsule Take 0.4 mg by mouth every day.     • Cholecalciferol (VITAMIN D3) 1000 units Cap Take 2,000 Units by mouth every morning.     • multivitamin (THERAGRAN) Tab Take 1 Tab by mouth every morning.     • fluticasone-salmeterol (ADVAIR) 250-50 MCG/DOSE AEROSOL POWDER, BREATH ACTIVATED Inhale 1 Puff by mouth every day.     • TUDORZA PRESSAIR 400 MCG/ACT AEROSOL POWDER, BREATH ACTIVATED Inhale 1 Puff by mouth 2 Times a Day.     • sulfamethoxazole-trimethoprim (BACTRIM DS) 800-160 MG tablet Take 1 Tab by mouth 2 times a day. 20 Tab 0       Family History  Denies fmh of DM, cardiac disease.     Social History  Social History   Substance Use Topics   • Smoking status: Former Smoker   • Smokeless tobacco: Never Used      Comment: 20 years ago   • Alcohol use Yes      Comment: occ       Allergies  Allergies   Allergen Reactions   • Aspirin Shortness of Breath        Physical Exam  Laboratory   Hemodynamics  Temp (24hrs), Av.8 °C (98.2 °F), Min:36.8 °C (98.2 °F), Max:36.8 °C (98.2 °F)   Temperature: 36.8 °C (98.2 °F)  Pulse  Av.3  Min: 74  Max: 91 Heart Rate (Monitored): 88  Blood Pressure : (!) 76/41, NIBP: 105/49      Respiratory      Respiration: 16, Pulse Oximetry: 94 %             Physical Exam    Nursing note and vitals reviewed.      Recent Labs      12/11/17   0310  12/12/17   1325   WBC  9.1  7.6   RBC  5.29  4.64*   HEMOGLOBIN  16.2  14.3   HEMATOCRIT  50.0  43.0   MCV  94.5  92.7   MCH  30.6  30.8   MCHC  32.4*  33.3*   RDW  46.8  46.5   PLATELETCT  367  276   MPV  9.5  9.8     Recent Labs      12/11/17   0310   SODIUM  133*   POTASSIUM  4.5   CHLORIDE  98   CO2  24   GLUCOSE  103*   BUN  19   CREATININE  0.86   CALCIUM  9.4     Recent Labs      12/11/17   0310   ALTSGPT  42   ASTSGOT  42   ALKPHOSPHAT  77   TBILIRUBIN  0.4   GLUCOSE  103*     Recent Labs      12/12/17   1325   APTT  37.7*   INR  1.16*     Recent Labs      12/12/17   1325   BNPBTYPENAT  357*         Lab Results   Component Value Date    TROPONINI 2.37 (H) 12/12/2017       Imaging  cxr reviewed  cta reviewed  ekg reviewed.    Assessment/Plan     I anticipate this patient will require at least two midnights for appropriate medical management, necessitating inpatient admission.    Failure to thrive in adult- (present on admission)   Assessment & Plan    Will get PT/OT to see him when stable .        Pneumonia- (present on admission)   Assessment & Plan    Possible HCAP, Pt having fever, CT showed possible pneumonia, checking procalcitonin will start ceftriaxone/doxi for now.         COPD (chronic obstructive pulmonary disease) (CMS-HCC)- (present on admission)   Assessment & Plan    No exacerbation. Continue home treatment.         Elevated troponin- (present on admission)   Assessment & Plan    Patient having no chest pain, cardiologist consulted and recommending to start heparin drip, since d dimer is elevated we are getting CTA to assess for PE.           Spent more than 70 min in patient care, reviewing patient chart, imaging  and labs report, coordinating plan with ERP and cardiologist, discussing with patient and patient's son, all questions answered,     VTE prophylaxis: heparin drip.

## 2017-12-13 NOTE — DIETARY
Nutrition Services:  Poor PO consult / BMI <19 (=18.65)    Pt is an 83 yr old female admitted with Elevated troponin.    Past Medical History:  BPH, COPD, Polio, Rheumatic Fever    Consult received for Poor PO.  Spoke with pt at bedside.  Pt reports no problems with his appetite.  Breakfast tray observed at bedside was <25% consumed.  Pt states that he is having trouble eating d/t his dentures not fitting properly.  Per pt and son at bedside, family member to bring denture adhesive from home.  Obtained preferences for softer foods and added to pt menu.  Pt reports drinking Boost BID at home.  Discussed supplements with RN and obtained order.  Will send BOOST Plus supplements BID to encourage PO and ensure adequate intake.      Diet:  Cardiac, No caffeine  Wt:  59 kg (130 lbs) - stated wt  BMI:  18.65  Pertinent Labs:  BUN 32, HbA1c 5.9  Pertinent Medications: Rocephin, Adoxa, Heparin, Theragran, Prilosec, Pericolace, Vitamin D  Fluids:  NS Infusion 50 mL/hr  Skin: No skin breakdown  GI:  Last BM 12/13    Plan/Recommend:  Record percentage of meals in ADL flowsheet to ensure adequate PO intake.  Nutrition Representative to see pt for meals daily.  RD to monitor wt and lab trends.  Obtain new wt for pt when feasible.    RD following.

## 2017-12-13 NOTE — CONSULTS
Cardiology Consult Note:    Vika Robles  Date & Time note created:    12/12/2017   6:09 PM     Referring MD:  Dr. Alexy Dupree    Patient ID:   Name:             Jignesh Dumont   YOB: 1934  Age:                 83 y.o.  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 According to patient's son patient was not himself yesterday and due to low blood pressure he was sent over here for further evaluation.     Prior to ankle fracture patient was reasonably healthy chanel. Since ankle fractures predominantly bedridden. No complaints of chest pain pressure or tightness. Dyspnea even with minimal activities. No complaints of palpitations orthopnea or PND. Denied any complaints of dizziness lightheadedness or LOC.    Review of Systems:      Constitutional: Denies fevers, Denies weight changes  Eyes: Denies changes in vision, no eye pain  Ears/Nose/Throat/Mouth: Denies nasal congestion or sore throat   Cardiovascular: -ve chest pain, -ve palpitations   Respiratory: +ve shortness of breath , Denies cough  Gastrointestinal/Hepatic: Denies abdominal pain, nausea, vomiting, diarrhea, constipation or GI bleeding   Genitourinary: Denies dysuria or frequency  Musculoskeletal/Rheum: Denies  joint pain and swelling, +ve edema  Skin: Denies rash  Neurological: Worsening tremors  Psychiatric: denies mood disorder   Endocrine: Rima thyroid problems  Heme/Oncology/Lymph Nodes: Denies enlarged lymph nodes, denies brusing or known bleeding disorder  All other systems were reviewed and are negative (AMA/CMS criteria)                 Past Medical History:   Past Medical History:   Diagnosis Date   • BPH    • COPD (chronic obstructive pulmonary disease) (CMS-HCC)    • Polio    • Rheumatic fever      Active Hospital Problems    Diagnosis   • Elevated troponin [R74.8]       Past Surgical History:  History reviewed. No pertinent surgical history.    Hospital Medications:    Current Facility-Administered Medications:   •  acetaminophen (TYLENOL) tablet 650 mg, 650 mg, Oral, Q6HRS PRN, Alexy Rondon M.D.  •  vitamin D (cholecalciferol) tablet 2,000 Units, 2,000 Units, Oral, Alexy VAZQUEZ M.D., Stopped at 12/12/17 1645  •  [START ON 12/13/2017] multivitamin (THERAGRAN) tablet 1 Tab, 1 Tab, Oral, Alexy VAZQUEZ M.D.  •  [START ON 12/13/2017] omeprazole (PRILOSEC) capsule 20 mg, 20 mg, Oral, DAILY, Alexy Rondon M.D.  •  oxycodone immediate-release (ROXICODONE) tablet 5 mg, 5 mg, Oral, Q4HRS PRN, Alexy Rondon M.D.  •  [START ON 12/13/2017] tamsulosin (FLOMAX) capsule 0.4 mg, 0.4 mg, Oral, DAILY, Alexy Rondon M.D.  •  tramadol (ULTRAM) 50 MG tablet 50 mg, 50 mg, Oral, Q4HRS PRN, Alexy Rondon M.D.  •  senna-docusate (PERICOLACE or SENOKOT S) 8.6-50 MG per tablet 2 Tab, 2 Tab, Oral, BID **AND** polyethylene glycol/lytes (MIRALAX) PACKET 1 Packet, 1 Packet, Oral, QDAY PRN **AND** magnesium hydroxide (MILK OF MAGNESIA) suspension 30 mL, 30 mL, Oral, QDAY PRN **AND** bisacodyl (DULCOLAX) suppository 10 mg, 10 mg, Rectal, QDAY PRN, Alexy Rondon M.D.  •  Respiratory Care per Protocol, , Nebulization, Continuous RT, Alexy Rondon M.D.  •  NS infusion, , Intravenous, Continuous, Alexy Rondon M.D., Last Rate: 50 mL/hr at 12/12/17 1747  •  enoxaparin (LOVENOX) inj 40 mg, 40 mg, Subcutaneous, DAILY, Alexy Rondon M.D., 40 mg at 12/12/17 1746  •  tiotropium (SPIRIVA) 18 MCG inhalation capsule 1 Cap, 1 Cap, Inhalation, DAILY, Alexy Rondon M.D.  •   budesonide-formoterol (SYMBICORT) 160-4.5 MCG/ACT inhaler 2 Puff, 2 Puff, Inhalation, BID, Alexy Rondon M.D.    Current Outpatient Medications:  Prescriptions Prior to Admission   Medication Sig Dispense Refill Last Dose   • acetaminophen (TYLENOL) 325 MG Tab Take 650 mg by mouth every 6 hours as needed.   12/12/2017 at PRN   • tramadol (ULTRAM) 50 MG Tab Take 50 mg by mouth every four hours as needed.   12/11/2017 at PRN   • oxycodone immediate-release (ROXICODONE) 5 MG Tab Take 5 mg by mouth every four hours as needed for Severe Pain.   12/12/2017 at PRN   • ondansetron (ZOFRAN ODT) 4 MG TABLET DISPERSIBLE Take 4 mg by mouth every four hours as needed for Nausea.   12/10/2017 at PRN   • omeprazole (PRILOSEC) 20 MG delayed-release capsule Take 20 mg by mouth every day.   12/12/2017 at 0800   • docusate sodium (COLACE) 100 MG Cap Take 100 mg by mouth 2 times a day.   12/12/2017 at 0800   • bisacodyl (DULCOLAX) 10 MG Suppos Insert 10 mg in rectum Once.   12/10/2017   • enoxaparin (LOVENOX) 40 MG/0.4ML Solution inj Inject 40 mg as instructed every day.   12/12/2017 at 0800    • tamsulosin (FLOMAX) 0.4 MG capsule Take 0.4 mg by mouth every day.   12/12/2017 at 0800   • Cholecalciferol (VITAMIN D3) 1000 units Cap Take 2,000 Units by mouth every morning.   12/12/2017 at 0800   • multivitamin (THERAGRAN) Tab Take 1 Tab by mouth every morning.   12/12/2017 at 0800   • fluticasone-salmeterol (ADVAIR) 250-50 MCG/DOSE AEROSOL POWDER, BREATH ACTIVATED Inhale 1 Puff by mouth every day.   12/11/2017 at 1300   • TUDORZA PRESSAIR 400 MCG/ACT AEROSOL POWDER, BREATH ACTIVATED Inhale 1 Puff by mouth 2 Times a Day.   12/12/2017 at 0800   • sulfamethoxazole-trimethoprim (BACTRIM DS) 800-160 MG tablet Take 1 Tab by mouth 2 times a day. 20 Tab 0 12/12/2017 at 0800       Medication Allergy:  Allergies   Allergen Reactions   • Aspirin Shortness of Breath       Family History:  History reviewed. No pertinent family  "history.    Social History:  Social History     Social History   • Marital status:      Spouse name: N/A   • Number of children: N/A   • Years of education: N/A     Occupational History   • Not on file.     Social History Main Topics   • Smoking status: Former Smoker     Quit date: 12/12/1987   • Smokeless tobacco: Never Used      Comment: 20 years ago   • Alcohol use Yes      Comment: occ   • Drug use: No   • Sexual activity: Not on file     Other Topics Concern   • Not on file     Social History Narrative   • No narrative on file         Physical Exam:  Vitals/ General Appearance:   Weight/BMI: Body mass index is 18.65 kg/m².  Blood pressure 121/63, pulse 74, temperature (!) 38.3 °C (100.9 °F), resp. rate 17, height 1.778 m (5' 10\"), weight 59 kg (130 lb), SpO2 92 %.  Vitals:    12/12/17 1415 12/12/17 1500 12/12/17 1600 12/12/17 1717   BP:    121/63   Pulse:  91 74 74   Resp: 15 16 16 17   Temp:    (!) 38.3 °C (100.9 °F)   SpO2:  95% 94% 92%   Weight:       Height:         Oxygen Therapy:  Pulse Oximetry: 92 %, O2 (LPM): 2, O2 Delivery: Silicone Nasal Cannula    Constitutional: Elderly male resting comfortably in bed  HENMT:  Normocephalic, Atraumatic, Oropharynx moist mucous membranes, No oral exudates, Nose normal.  No thyromegaly.  Eyes:  EOMI, Conjunctiva normal, No discharge.  Neck:  Normal range of motion, No cervical tenderness, difficult to assess JVP.  Cardiovascular:  Normal heart rate, Normal rhythm, No murmurs, No rubs, No gallops.   systolic murmur best heard in the tricuspid area   Left lower extremity in a cast  Right lower extremity is weak with skin breakdown  Lungs:  decreased breath sounds bilateral lungs.    Abdomen: Bowel sounds normal, Soft, No tenderness, No guarding, No rebound, No masses, No hepatosplenomegaly.  Skin: Skin breakdown right lower extremity.  Neurologic: Alert & oriented x 3,   Psychiatric: Affect normal, Judgment normal, Mood normal.      [facility] (Data Review):   "   Records reviewed and summarized in current documentation    Lab Data Review:  Recent Results (from the past 24 hour(s))   CBC WITH DIFFERENTIAL    Collection Time: 12/12/17  1:25 PM   Result Value Ref Range    WBC 7.6 4.8 - 10.8 K/uL    RBC 4.64 (L) 4.70 - 6.10 M/uL    Hemoglobin 14.3 14.0 - 18.0 g/dL    Hematocrit 43.0 42.0 - 52.0 %    MCV 92.7 81.4 - 97.8 fL    MCH 30.8 27.0 - 33.0 pg    MCHC 33.3 (L) 33.7 - 35.3 g/dL    RDW 46.5 35.9 - 50.0 fL    Platelet Count 276 164 - 446 K/uL    MPV 9.8 9.0 - 12.9 fL    Neutrophils-Polys 88.40 (H) 44.00 - 72.00 %    Lymphocytes 6.30 (L) 22.00 - 41.00 %    Monocytes 4.20 0.00 - 13.40 %    Eosinophils 0.00 0.00 - 6.90 %    Basophils 0.40 0.00 - 1.80 %    Immature Granulocytes 0.70 0.00 - 0.90 %    Nucleated RBC 0.00 /100 WBC    Neutrophils (Absolute) 6.76 1.82 - 7.42 K/uL    Lymphs (Absolute) 0.48 (L) 1.00 - 4.80 K/uL    Monos (Absolute) 0.32 0.00 - 0.85 K/uL    Eos (Absolute) 0.00 0.00 - 0.51 K/uL    Baso (Absolute) 0.03 0.00 - 0.12 K/uL    Immature Granulocytes (abs) 0.05 0.00 - 0.11 K/uL    NRBC (Absolute) 0.00 K/uL   LACTIC ACID    Collection Time: 12/12/17  1:25 PM   Result Value Ref Range    Lactic Acid 1.7 0.5 - 2.0 mmol/L   TROPONIN    Collection Time: 12/12/17  1:25 PM   Result Value Ref Range    Troponin I 2.37 (H) 0.00 - 0.04 ng/mL   BTYPE NATRIURETIC PEPTIDE    Collection Time: 12/12/17  1:25 PM   Result Value Ref Range    B Natriuretic Peptide 357 (H) 0 - 100 pg/mL   APTT    Collection Time: 12/12/17  1:25 PM   Result Value Ref Range    APTT 37.7 (H) 24.7 - 36.0 sec   PROTHROMBIN TIME    Collection Time: 12/12/17  1:25 PM   Result Value Ref Range    PT 14.5 12.0 - 14.6 sec    INR 1.16 (H) 0.87 - 1.13   EKG    Collection Time: 12/12/17  2:41 PM   Result Value Ref Range    Report       [facility] Emergency Dept.    Test Date:  2017-12-12  Pt Name:    LILIAN ESPINAL                  Department: ER  MRN:        [id number]                      Room:        GR 28  Gender:     M                            Technician: [id number]  :        [id number]                   Requested By:GUY G GANSERT  Order #:    [id number]                    Reading MD:    Measurements  Intervals                                Axis  Rate:       91                           P:          49  AR:         157                          QRS:        -24  QRSD:       100                          T:          15  QT:         360  QTc:        443    Interpretive Statements  SINUS RHYTHM  BIATRIAL ABNORMALITIES  BORDERLINE LEFT AXIS DEVIATION  BORDERLINE LOW VOLTAGE IN FRONTAL LEADS  RSR' IN V1 OR V2, PROBABLY NORMAL VARIANT  BORDERLINE R WAVE PROGRESSION, ANTERIOR LEADS  ARTIFACT IN LEAD(S) I,II,III,aVR,aVL,aVF,V2  Compared to ECG 2009 09:15:34  Atrial abnormality now present  RSR' in V1 or V 2 now present  Sinus bradycardia no longer present     Influenza By PCR, A/B    Collection Time: 17  3:03 PM   Result Value Ref Range    Influenza virus A RNA Negative Negative    Influenza virus B, PCR Negative Negative   URINALYSIS    Collection Time: 17  3:42 PM   Result Value Ref Range    Color Yellow     Character Turbid (A)     Specific Gravity 1.022 <1.035    Ph 5.0 5.0 - 8.0    Glucose Negative Negative mg/dL    Ketones Negative Negative mg/dL    Protein 30 (A) Negative mg/dL    Bilirubin Negative Negative    Urobilinogen, Urine 0.2 Negative    Nitrite Negative Negative    Leukocyte Esterase Negative Negative    Occult Blood Moderate (A) Negative    Micro Urine Req Microscopic    URINE MICROSCOPIC (W/UA)    Collection Time: 17  3:42 PM   Result Value Ref Range    WBC 5-10 (A) /hpf    RBC 5-10 (A) /hpf    Bacteria Negative None /hpf    Epithelial Cells Few /hpf    Uric Acid Crystal Positive /hpf    Hyaline Cast 11-20 (A) /lpf    Granular Casts 0-2 /lpf   LACTIC ACID    Collection Time: 17  4:06 PM   Result Value Ref Range    Lactic Acid 1.5 0.5 - 2.0 mmol/L   COMP METABOLIC PANEL     Collection Time: 17  4:06 PM   Result Value Ref Range    Sodium 136 135 - 145 mmol/L    Potassium 4.2 3.6 - 5.5 mmol/L    Chloride 107 96 - 112 mmol/L    Co2 22 20 - 33 mmol/L    Anion Gap 7.0 0.0 - 11.9    Glucose 94 65 - 99 mg/dL    Bun 34 (H) 8 - 22 mg/dL    Creatinine 0.90 0.50 - 1.40 mg/dL    Calcium 8.2 (L) 8.5 - 10.5 mg/dL    AST(SGOT) 89 (H) 12 - 45 U/L    ALT(SGPT) 57 (H) 2 - 50 U/L    Alkaline Phosphatase 57 30 - 99 U/L    Total Bilirubin 0.3 0.1 - 1.5 mg/dL    Albumin 3.0 (L) 3.2 - 4.9 g/dL    Total Protein 5.7 (L) 6.0 - 8.2 g/dL    Globulin 2.7 1.9 - 3.5 g/dL    A-G Ratio 1.1 g/dL   ESTIMATED GFR    Collection Time: 17  4:06 PM   Result Value Ref Range    GFR If African American >60 >60 mL/min/1.73 m 2    GFR If Non African American >60 >60 mL/min/1.73 m 2   D-Dimer    Collection Time: 17  4:53 PM   Result Value Ref Range    D-Dimer Screen 1504 (H) <250 ng/mL(D-DU)        Chest Xray:  Reviewed  1.  Pulmonary vascular congestion and probable minimal edema.  2.  Mild LEFT lung base atelectasis and probable small effusion.  3.  No definite pneumonia or pneumothorax    EK17  Sinus rhythm 91 bpm left axis deviation and low voltage in frontal leads and nonspecific ST-T wave changes.    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 According to patient's son patient was not himself yesterday and due to low blood pressure he was sent over here for further evaluation.     Elevated troponin:  Based on overall presentation I.e being bedridden due to ankle fracture ER presentation for hypoxemia high probability for PE rather than ACS  Consider starting patient on  anticoagulation with heparin or Lovenox  Consider getting CT PE study today.  If CT PE study is negative  Start patient on aspirin and beta blockers if heart rate allows and statins.  Will try to get echocardiogram tomorrow to assess underlying LVEF.    Patient is also having low-grade fever possible urinary tract infection if no confrontation consider initiating antibiotics.    Thank you for allowing me to participate in taking care of patient.    Vika Cornell MD.

## 2017-12-13 NOTE — DISCHARGE PLANNING
Medical Social Work    Pt came from SNF, according to ED notes. Unclear what SNF. SW will f/u with pt.

## 2017-12-13 NOTE — PROGRESS NOTES
[facility]    Date of Service: 2017    Chief Complaint  83 y.o. male admitted 2017 with acute hypoxemic respiratory failure secondary to pneumonia.    Interval Problem Update  No acute issues, patient feeling better today. Having productive cough but Patient denies fevers/chills, chest pain, shortness of breath or nausea/vommiting.   Continue IV antibiotics for presumed pneumonia  Continue RT protocol, duo nebs, Pep therapy if warranted, and incentive spirometry.   Echocardiogram pending    Consultants/Specialty  Cardiology    Disposition  TBD        Review of Systems   Constitutional: Positive for malaise/fatigue. Negative for chills and fever.   HENT: Negative for congestion, hearing loss, sore throat and tinnitus.    Eyes: Negative for blurred vision, double vision, photophobia and pain.   Respiratory: Positive for cough and sputum production. Negative for hemoptysis, shortness of breath and stridor.    Cardiovascular: Negative for chest pain, palpitations, orthopnea, claudication and PND.   Gastrointestinal: Negative for blood in stool, constipation, heartburn, melena, nausea and vomiting.   Genitourinary: Negative for dysuria, frequency and urgency.   Musculoskeletal: Negative for back pain, myalgias and neck pain.   Neurological: Positive for weakness. Negative for dizziness, tingling, tremors, sensory change, speech change and headaches.   Psychiatric/Behavioral: Negative for depression, memory loss and suicidal ideas. The patient is not nervous/anxious.       Physical Exam  Laboratory/Imaging   Hemodynamics  Temp (24hrs), Av.7 °C (99.9 °F), Min:36.8 °C (98.2 °F), Max:38.6 °C (101.5 °F)   Temperature: 38 °C (100.4 °F)  Pulse  Av.8  Min: 74  Max: 92 Heart Rate (Monitored): 88  Blood Pressure : 124/61, NIBP: 105/49      Respiratory      Respiration: 17, Pulse Oximetry: 92 %             Fluids    Intake/Output Summary (Last 24 hours) at 17 1023  Last data filed at  12/13/17 0600   Gross per 24 hour   Intake                0 ml   Output              300 ml   Net             -300 ml       Nutrition  Orders Placed This Encounter   Procedures   • Diet Order     Standing Status:   Standing     Number of Occurrences:   1     Order Specific Question:   Diet:     Answer:   Cardiac [6]     Physical Exam   Constitutional: He is oriented to person, place, and time. He appears well-developed and well-nourished.   HENT:   Head: Normocephalic and atraumatic.   Mouth/Throat: No oropharyngeal exudate.   Eyes: Conjunctivae are normal. Pupils are equal, round, and reactive to light. Right eye exhibits no discharge. No scleral icterus.   Neck: Neck supple. No JVD present. No thyromegaly present.   Cardiovascular: Normal rate and intact distal pulses.    No murmur heard.  Pulmonary/Chest: Effort normal. No stridor. No respiratory distress. He has no wheezes. He has rales (bibasilar rales).   Abdominal: Soft. Bowel sounds are normal. He exhibits no distension. There is no tenderness. There is no rebound.   Musculoskeletal: Normal range of motion. He exhibits no edema.   Neurological: He is alert and oriented to person, place, and time. No cranial nerve deficit.   Skin: Skin is warm. No erythema.   Psychiatric: He has a normal mood and affect. His behavior is normal. Thought content normal.       Recent Labs      12/11/17   0310  12/12/17   1325  12/13/17   0214   WBC  9.1  7.6  7.3   RBC  5.29  4.64*  4.40*   HEMOGLOBIN  16.2  14.3  13.3*   HEMATOCRIT  50.0  43.0  40.7*   MCV  94.5  92.7  92.5   MCH  30.6  30.8  30.2   MCHC  32.4*  33.3*  32.7*   RDW  46.8  46.5  46.3   PLATELETCT  367  276  273   MPV  9.5  9.8  9.7     Recent Labs      12/11/17   0310  12/12/17   1606  12/13/17   0214   SODIUM  133*  136  137   POTASSIUM  4.5  4.2  3.7   CHLORIDE  98  107  109   CO2  24  22  17*   GLUCOSE  103*  94  79   BUN  19  34*  32*   CREATININE  0.86  0.90  0.80   CALCIUM  9.4  8.2*  8.0*     Recent Labs       12/12/17   1325  12/13/17   0214  12/13/17   0831   APTT  37.7*  74.1*  34.7   INR  1.16*   --    --      Recent Labs      12/12/17   1325   BNPBTYPENAT  357*     Recent Labs      12/13/17   0214   TRIGLYCERIDE  73   HDL  29*   LDL  40          Assessment/Plan     Failure to thrive in adult- (present on admission)   Assessment & Plan    Pending PT/OT        Pneumonia- (present on admission)   Assessment & Plan    CT showed possible pneumonia.  Pro calcitonin elevated.  Continue IV Ceftriaxone/Doxycyline.  Pending cultures.  Trend pro calcitonin levels.          COPD (chronic obstructive pulmonary disease) (CMS-HCC)- (present on admission)   Assessment & Plan    Not in Acute exacerbation.  Continue RT protocol, duo nebs, Pep therapy if warranted, and incentive spirometry.   Wean oxygen as tolerated        Elevated troponin- (present on admission)   Assessment & Plan    Denies chest pain.  CTA PE neg but does show possible pneumonia.  troponin trending down.  D/c heparin gtt as CTA PE neg for PE.  Echocardiogram pending  Cardiology following.           Patient plan of care discussed at multidisplinary team rounds and with patient and R.N at [facility].      Reviewed items::  Labs reviewed, Medications reviewed and Radiology images reviewed  Bedolla catheter::  No Bedolla  DVT prophylaxis pharmacological::  Heparin  Antibiotics:  Treating active infection/contamination beyond 24 hours perioperative coverage

## 2017-12-14 LAB
ALBUMIN SERPL BCP-MCNC: 2.5 G/DL (ref 3.2–4.9)
ALBUMIN/GLOB SERPL: 1 G/DL
ALP SERPL-CCNC: 48 U/L (ref 30–99)
ALT SERPL-CCNC: 44 U/L (ref 2–50)
ANION GAP SERPL CALC-SCNC: 6 MMOL/L (ref 0–11.9)
AST SERPL-CCNC: 53 U/L (ref 12–45)
BASOPHILS # BLD AUTO: 0.9 % (ref 0–1.8)
BASOPHILS # BLD: 0.05 K/UL (ref 0–0.12)
BILIRUB SERPL-MCNC: 0.2 MG/DL (ref 0.1–1.5)
BUN SERPL-MCNC: 26 MG/DL (ref 8–22)
CALCIUM SERPL-MCNC: 8.3 MG/DL (ref 8.5–10.5)
CHLORIDE SERPL-SCNC: 111 MMOL/L (ref 96–112)
CO2 SERPL-SCNC: 21 MMOL/L (ref 20–33)
CREAT SERPL-MCNC: 0.7 MG/DL (ref 0.5–1.4)
EOSINOPHIL # BLD AUTO: 0 K/UL (ref 0–0.51)
EOSINOPHIL NFR BLD: 0 % (ref 0–6.9)
ERYTHROCYTE [DISTWIDTH] IN BLOOD BY AUTOMATED COUNT: 45.4 FL (ref 35.9–50)
GFR SERPL CREATININE-BSD FRML MDRD: >60 ML/MIN/1.73 M 2
GLOBULIN SER CALC-MCNC: 2.4 G/DL (ref 1.9–3.5)
GLUCOSE SERPL-MCNC: 105 MG/DL (ref 65–99)
HCT VFR BLD AUTO: 37.2 % (ref 42–52)
HGB BLD-MCNC: 12.5 G/DL (ref 14–18)
LG PLATELETS BLD QL SMEAR: NORMAL
LV EJECT FRACT  99904: 70
LV EJECT FRACT MOD 2C 99903: 70.73
LV EJECT FRACT MOD 4C 99902: 69.08
LV EJECT FRACT MOD BP 99901: 68.55
LYMPHOCYTES # BLD AUTO: 0.92 K/UL (ref 1–4.8)
LYMPHOCYTES NFR BLD: 16.1 % (ref 22–41)
MAGNESIUM SERPL-MCNC: 1.8 MG/DL (ref 1.5–2.5)
MANUAL DIFF BLD: ABNORMAL
MCH RBC QN AUTO: 30.3 PG (ref 27–33)
MCHC RBC AUTO-ENTMCNC: 33.6 G/DL (ref 33.7–35.3)
MCV RBC AUTO: 90.3 FL (ref 81.4–97.8)
MONOCYTES # BLD AUTO: 0.15 K/UL (ref 0–0.85)
MONOCYTES NFR BLD AUTO: 2.7 % (ref 0–13.4)
MORPHOLOGY BLD-IMP: NORMAL
NEUTROPHILS # BLD AUTO: 4.58 K/UL (ref 1.82–7.42)
NEUTROPHILS NFR BLD: 50.9 % (ref 44–72)
NEUTS BAND NFR BLD MANUAL: 29.4 % (ref 0–10)
NRBC # BLD AUTO: 0 K/UL
NRBC BLD AUTO-RTO: 0 /100 WBC
PLATELET # BLD AUTO: 290 K/UL (ref 164–446)
PLATELET BLD QL SMEAR: NORMAL
PMV BLD AUTO: 9.9 FL (ref 9–12.9)
POTASSIUM SERPL-SCNC: 3.5 MMOL/L (ref 3.6–5.5)
PROT SERPL-MCNC: 4.9 G/DL (ref 6–8.2)
RBC # BLD AUTO: 4.12 M/UL (ref 4.7–6.1)
RBC BLD AUTO: PRESENT
SODIUM SERPL-SCNC: 138 MMOL/L (ref 135–145)
WBC # BLD AUTO: 5.7 K/UL (ref 4.8–10.8)

## 2017-12-14 PROCEDURE — A9270 NON-COVERED ITEM OR SERVICE: HCPCS | Performed by: INTERNAL MEDICINE

## 2017-12-14 PROCEDURE — 700111 HCHG RX REV CODE 636 W/ 250 OVERRIDE (IP): Performed by: HOSPITALIST

## 2017-12-14 PROCEDURE — 99232 SBSQ HOSP IP/OBS MODERATE 35: CPT | Performed by: HOSPITALIST

## 2017-12-14 PROCEDURE — 700105 HCHG RX REV CODE 258: Performed by: HOSPITALIST

## 2017-12-14 PROCEDURE — 700102 HCHG RX REV CODE 250 W/ 637 OVERRIDE(OP): Performed by: HOSPITALIST

## 2017-12-14 PROCEDURE — 770020 HCHG ROOM/CARE - TELE (206)

## 2017-12-14 PROCEDURE — 36415 COLL VENOUS BLD VENIPUNCTURE: CPT

## 2017-12-14 PROCEDURE — 700102 HCHG RX REV CODE 250 W/ 637 OVERRIDE(OP): Performed by: INTERNAL MEDICINE

## 2017-12-14 PROCEDURE — A9270 NON-COVERED ITEM OR SERVICE: HCPCS | Performed by: HOSPITALIST

## 2017-12-14 PROCEDURE — 85027 COMPLETE CBC AUTOMATED: CPT

## 2017-12-14 PROCEDURE — 80053 COMPREHEN METABOLIC PANEL: CPT

## 2017-12-14 PROCEDURE — 83735 ASSAY OF MAGNESIUM: CPT

## 2017-12-14 PROCEDURE — 85007 BL SMEAR W/DIFF WBC COUNT: CPT

## 2017-12-14 RX ORDER — ATORVASTATIN CALCIUM 10 MG/1
10 TABLET, FILM COATED ORAL
Status: DISCONTINUED | OUTPATIENT
Start: 2017-12-14 | End: 2017-12-16 | Stop reason: HOSPADM

## 2017-12-14 RX ADMIN — HEPARIN SODIUM 5000 UNITS: 5000 INJECTION, SOLUTION INTRAVENOUS; SUBCUTANEOUS at 14:00

## 2017-12-14 RX ADMIN — BUDESONIDE AND FORMOTEROL FUMARATE DIHYDRATE 2 PUFF: 160; 4.5 AEROSOL RESPIRATORY (INHALATION) at 21:19

## 2017-12-14 RX ADMIN — VITAMIN D, TAB 1000IU (100/BT) 2000 UNITS: 25 TAB at 09:48

## 2017-12-14 RX ADMIN — DOXYCYCLINE 100 MG: 100 TABLET ORAL at 09:49

## 2017-12-14 RX ADMIN — SODIUM CHLORIDE: 9 INJECTION, SOLUTION INTRAVENOUS at 21:18

## 2017-12-14 RX ADMIN — HEPARIN SODIUM 5000 UNITS: 5000 INJECTION, SOLUTION INTRAVENOUS; SUBCUTANEOUS at 05:20

## 2017-12-14 RX ADMIN — THERA TABS 1 TABLET: TAB at 09:49

## 2017-12-14 RX ADMIN — HEPARIN SODIUM 5000 UNITS: 5000 INJECTION, SOLUTION INTRAVENOUS; SUBCUTANEOUS at 21:19

## 2017-12-14 RX ADMIN — ATORVASTATIN CALCIUM 10 MG: 10 TABLET, FILM COATED ORAL at 21:19

## 2017-12-14 RX ADMIN — TIOTROPIUM BROMIDE 1 CAPSULE: 18 CAPSULE ORAL; RESPIRATORY (INHALATION) at 09:49

## 2017-12-14 RX ADMIN — CEFTRIAXONE 2 G: 2 INJECTION, POWDER, FOR SOLUTION INTRAMUSCULAR; INTRAVENOUS at 21:19

## 2017-12-14 RX ADMIN — DOXYCYCLINE 100 MG: 100 TABLET ORAL at 21:19

## 2017-12-14 RX ADMIN — OMEPRAZOLE 20 MG: 20 CAPSULE, DELAYED RELEASE ORAL at 09:48

## 2017-12-14 RX ADMIN — TAMSULOSIN HYDROCHLORIDE 0.4 MG: 0.4 CAPSULE ORAL at 09:49

## 2017-12-14 RX ADMIN — BUDESONIDE AND FORMOTEROL FUMARATE DIHYDRATE 2 PUFF: 160; 4.5 AEROSOL RESPIRATORY (INHALATION) at 09:49

## 2017-12-14 ASSESSMENT — ENCOUNTER SYMPTOMS
CARDIOVASCULAR NEGATIVE: 1
PHOTOPHOBIA: 0
RESPIRATORY NEGATIVE: 1
TINGLING: 0
SPEECH CHANGE: 0
MEMORY LOSS: 1
COUGH: 1
BLURRED VISION: 0
COUGH: 0
WEAKNESS: 1
NECK PAIN: 0
EYES NEGATIVE: 1
PALPITATIONS: 0
DIZZINESS: 0
SENSORY CHANGE: 0
HEMOPTYSIS: 0
TREMORS: 0
VOMITING: 0
PSYCHIATRIC NEGATIVE: 1
ABDOMINAL PAIN: 0
MUSCULOSKELETAL NEGATIVE: 1
MEMORY LOSS: 0
HEADACHES: 0
PND: 0
NEUROLOGICAL NEGATIVE: 1
BACK PAIN: 0
SPUTUM PRODUCTION: 1
CHILLS: 0
SHORTNESS OF BREATH: 0
ORTHOPNEA: 0
DEPRESSION: 0
CLAUDICATION: 0
MYALGIAS: 0
CONSTIPATION: 0
FEVER: 0
BLOOD IN STOOL: 0
SORE THROAT: 0
CONSTITUTIONAL NEGATIVE: 1
NERVOUS/ANXIOUS: 0
EYE PAIN: 0
DOUBLE VISION: 0
GASTROINTESTINAL NEGATIVE: 1
STRIDOR: 0
HEARTBURN: 0
NAUSEA: 0

## 2017-12-14 ASSESSMENT — PAIN SCALES - GENERAL
PAINLEVEL_OUTOF10: 0

## 2017-12-14 NOTE — PROGRESS NOTES
Pt a&o x3, denies pain and no distress noted.  Appetite better this afternoon and bp wnl throughout day.  Updated pt with plan of care, pt for stress test on fri.  Bed alarm on, call light in reach and encourage to call for assistance

## 2017-12-14 NOTE — PROGRESS NOTES
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 According to patient's son patient was not himself yesterday and due to low blood pressure he was sent over here for further evaluation.     12/13: feels better, cachetic appearance, low O2 requirements today. PT/OT, family at bedside  12/14: no complaints, resting in bed, no angina, afebrile    Telemetry: sinus rhythm 74-94, PAC's    Review of Systems   Constitutional: Positive for malaise/fatigue. Negative for fever.   Respiratory: Negative for cough and shortness of breath.    Cardiovascular: Negative for chest pain, palpitations, orthopnea, claudication, leg swelling and PND.   Gastrointestinal: Negative for abdominal pain.   Musculoskeletal: Negative for myalgias.   Neurological: Positive for weakness. Negative for dizziness.   Psychiatric/Behavioral: Positive for memory loss.   All other systems reviewed and are negative.      Physical Exam   Constitutional: He is oriented to person, place, and time. He appears well-developed and well-nourished. No distress.   HENT:   Head: Normocephalic and atraumatic.   Eyes: EOM are normal.   Neck: Normal range of motion. No JVD present.   Cardiovascular: Normal rate, regular rhythm, normal heart sounds and intact distal pulses.    No murmur heard.  Pulmonary/Chest: Effort normal and breath sounds normal. No respiratory distress. He has no wheezes. He has no rales.   Abdominal: Soft. Bowel sounds are normal.   Musculoskeletal: He exhibits no edema.   R ankle with soft cast   Neurological: He is alert and oriented to person, place, and time.   Skin: Skin is warm and dry.   Psychiatric: He has a normal mood and  affect.   Nursing note and vitals reviewed.      Hemodynamics:  Temp (24hrs), Av.1 °C (98.8 °F), Min:36.7 °C (98 °F), Max:37.7 °C (99.8 °F)  Temperature: 36.9 °C (98.4 °F)  Pulse  Av.3  Min: 67  Max: 92   Blood Pressure : 101/60     Respiratory:    Respiration: 18, Pulse Oximetry: 95 %        RUL Breath Sounds: Clear, RML Breath Sounds: Clear, RLL Breath Sounds: Clear, CHERELLE Breath Sounds: Clear, LLL Breath Sounds: Clear  Fluids:     Weight: 64.8 kg (142 lb 13.7 oz)  GI/Nutrition:  Orders Placed This Encounter   Procedures   • DIET ORDER     Standing Status:   Standing     Number of Occurrences:   1     Order Specific Question:   Diet:     Answer:   Cardiac [6]   • DIET NPO     Standing Status:   Standing     Number of Occurrences:   1     Order Specific Question:   Restrict to:     Answer:   Sips with Medications [3]     Lab Results:  Recent Labs      17   1325  17   0214  17   0303   WBC  7.6  7.3  5.7   RBC  4.64*  4.40*  4.12*   HEMOGLOBIN  14.3  13.3*  12.5*   HEMATOCRIT  43.0  40.7*  37.2*   MCV  92.7  92.5  90.3   MCH  30.8  30.2  30.3   MCHC  33.3*  32.7*  33.6*   RDW  46.5  46.3  45.4   PLATELETCT  276  273  290   MPV  9.8  9.7  9.9     Recent Labs      17   1606  17   0214  17   0303   SODIUM  136  137  138   POTASSIUM  4.2  3.7  3.5*   CHLORIDE  107  109  111   CO2  22  17*  21   GLUCOSE  94  79  105*   BUN  34*  32*  26*   CREATININE  0.90  0.80  0.70   CALCIUM  8.2*  8.0*  8.3*     Recent Labs      17   1325  17   0214  17   0831   APTT  37.7*  74.1*  34.7   INR  1.16*   --    --      Recent Labs      17   1325   BNPBTYPENAT  357*     Recent Labs      17   1325  174  17   0536  17   0831   TROPONINI  2.37*  1.35*  1.14*  0.94*   BNPBTYPENAT  357*   --    --    --      Recent Labs      17   TRIGLYCERIDE  73   HDL  29*   LDL  40         Medical Decision Making, by Problem:  Active Hospital  Problems    Diagnosis   • Failure to thrive in adult [R62.7]   • COPD (chronic obstructive pulmonary disease) (CMS-[facility]) [J44.9]   • Pneumonia [J18.9]   • Elevated troponin [R74.8]     Plan:  1. Failure to thrive  -needs dietary consult for protein malnourishment  -RN to place order    2. COPD  -managed by hosp team, low O2 requirements    3. Questionable pneumonia on CXR  - prophylactic ABX per hosp team  - BP improved with fluids, afebrile overnight and today    4. Elevated troponin  -trop trended down, no angina overnight or today  -plan for nuc tomorrow AM, med management but family and patient want nuc imaging for more answers  -low threshold for cardiac cath  -heparin gtt stopped with no PE on CT; d dimer elevated, ok for lovenox due to bedrest  -echo pending results still, will make sure reading doc reads today    We will follow alongside you in the care of this patient.       Reviewed items::  EKG reviewed, Radiology images reviewed, Medications reviewed and Labs reviewed  Bedolla catheter::  No Bedolla  DVT prophylaxis pharmacological::  Not indicated at this time, ambulatory and Enoxaparin (Lovenox)  DVT prophylaxis - mechanical:  SCDs  Ulcer Prophylaxis::  Yes

## 2017-12-14 NOTE — PROGRESS NOTES
Patient resting quietly at this time. Assisted with set up of dinner colten. No distress. Denies chest pain or any sob. Call light and personal items within reach

## 2017-12-14 NOTE — PROGRESS NOTES
[facility]    Date of Service: 2017    Chief Complaint  83 y.o. male admitted 2017 with acute hypoxemic respiratory failure secondary to pneumonia.    Interval Problem Update      No acute issues patient says he is comfortable. Patient denies fevers/chills, chest pain, shortness of breath or nausea/vommiting.   CTA PE neg.  Echocardiogram grossly normal per cardiology  NM Stress test pending despite even if he had positive findings, would be treated medically.    Consultants/Specialty  Cardiology    Disposition  TBD      Grossly unchanged  Review of Systems   Constitutional: Positive for malaise/fatigue. Negative for chills and fever.   HENT: Negative for congestion, hearing loss, sore throat and tinnitus.    Eyes: Negative for blurred vision, double vision, photophobia and pain.   Respiratory: Positive for cough and sputum production. Negative for hemoptysis, shortness of breath and stridor.    Cardiovascular: Negative for chest pain, palpitations, orthopnea, claudication and PND.   Gastrointestinal: Negative for blood in stool, constipation, heartburn, melena, nausea and vomiting.   Genitourinary: Negative for dysuria, frequency and urgency.   Musculoskeletal: Negative for back pain, myalgias and neck pain.   Neurological: Positive for weakness. Negative for dizziness, tingling, tremors, sensory change, speech change and headaches.   Psychiatric/Behavioral: Negative for depression, memory loss and suicidal ideas. The patient is not nervous/anxious.       Physical Exam  Laboratory/Imaging   Hemodynamics  Temp (24hrs), Av.1 °C (98.7 °F), Min:36.7 °C (98 °F), Max:37.7 °C (99.8 °F)   Temperature: 36.8 °C (98.2 °F)  Pulse  Av.7  Min: 67  Max: 92    Blood Pressure : 100/57      Respiratory      Respiration: 16, Pulse Oximetry: 93 %        RUL Breath Sounds: Clear, RML Breath Sounds: Clear, RLL Breath Sounds: Clear, CHERELLE Breath Sounds: Clear, LLL Breath Sounds:  Clear    Fluids    Intake/Output Summary (Last 24 hours) at 12/14/17 1201  Last data filed at 12/14/17 0522   Gross per 24 hour   Intake              300 ml   Output              700 ml   Net             -400 ml       Nutrition  Orders Placed This Encounter   Procedures   • DIET ORDER     Standing Status:   Standing     Number of Occurrences:   1     Order Specific Question:   Diet:     Answer:   Cardiac [6]   • DIET NPO     Standing Status:   Standing     Number of Occurrences:   8     Order Specific Question:   Restrict to:     Answer:   Sips with Medications [3]   grossly unchanged  Physical Exam   Constitutional: He is oriented to person, place, and time. He appears well-developed and well-nourished.   HENT:   Head: Normocephalic and atraumatic.   Mouth/Throat: No oropharyngeal exudate.   Eyes: Conjunctivae are normal. Pupils are equal, round, and reactive to light. Right eye exhibits no discharge. No scleral icterus.   Neck: Neck supple. No JVD present. No thyromegaly present.   Cardiovascular: Normal rate and intact distal pulses.    No murmur heard.  Pulmonary/Chest: Effort normal. No stridor. No respiratory distress. He has no wheezes. He has rales (bibasilar rales).   Abdominal: Soft. Bowel sounds are normal. He exhibits no distension. There is no tenderness. There is no rebound.   Musculoskeletal: Normal range of motion. He exhibits no edema.   Neurological: He is alert and oriented to person, place, and time. No cranial nerve deficit.   Skin: Skin is warm. No erythema.   Psychiatric: He has a normal mood and affect. His behavior is normal. Thought content normal.       Recent Labs      12/12/17   1325  12/13/17   0214  12/14/17   0303   WBC  7.6  7.3  5.7   RBC  4.64*  4.40*  4.12*   HEMOGLOBIN  14.3  13.3*  12.5*   HEMATOCRIT  43.0  40.7*  37.2*   MCV  92.7  92.5  90.3   MCH  30.8  30.2  30.3   MCHC  33.3*  32.7*  33.6*   RDW  46.5  46.3  45.4   PLATELETCT  276  273  290   MPV  9.8  9.7  9.9     Recent  Labs      12/12/17   1606  12/13/17   0214  12/14/17   0303   SODIUM  136  137  138   POTASSIUM  4.2  3.7  3.5*   CHLORIDE  107  109  111   CO2  22  17*  21   GLUCOSE  94  79  105*   BUN  34*  32*  26*   CREATININE  0.90  0.80  0.70   CALCIUM  8.2*  8.0*  8.3*     Recent Labs      12/12/17   1325  12/13/17   0214  12/13/17   0831   APTT  37.7*  74.1*  34.7   INR  1.16*   --    --      Recent Labs      12/12/17   1325   BNPBTYPENAT  357*     Recent Labs      12/13/17   0214   TRIGLYCERIDE  73   HDL  29*   LDL  40          Assessment/Plan     Failure to thrive in adult- (present on admission)   Assessment & Plan    Pending PT/OT        Pneumonia- (present on admission)   Assessment & Plan    CT showed possible pneumonia.  Pro calcitonin elevated.  Continue IV Ceftriaxone/Doxycyline.  Pending neg thus far   Trend pro calcitonin levels.          COPD (chronic obstructive pulmonary disease) (CMS-HCC)- (present on admission)   Assessment & Plan    Not in Acute exacerbation.  Continue RT protocol, duo nebs, Pep therapy if warranted, and incentive spirometry.   Wean oxygen as tolerated        Elevated troponin- (present on admission)   Assessment & Plan    Denies chest pain.  Possibly 2/2 to demand ischemia?  CTA PE neg but does show possible pneumonia  troponin trending down.  D/c heparin gtt as CTA PE neg for PE.  Echocardiogram grossly normal.  Cardiology following.   NM Stress test today          Patient plan of care discussed at multidisplinary team rounds and with patient and R.N at beside.      Reviewed items::  Labs reviewed, Medications reviewed and Radiology images reviewed  Bedolla catheter::  No Bedolla  DVT prophylaxis pharmacological::  Heparin  Antibiotics:  Treating active infection/contamination beyond 24 hours perioperative coverage

## 2017-12-14 NOTE — CARE PLAN
Problem: Safety  Goal: Will remain free from injury  Outcome: PROGRESSING AS EXPECTED  Bed locked and in lowest position, bed alarm on, call light in reach    Problem: Knowledge Deficit  Goal: Knowledge of disease process/condition, treatment plan, diagnostic tests, and medications will improve  Outcome: PROGRESSING AS EXPECTED  Updated with plan of care, cont iv abx, stress test fri

## 2017-12-14 NOTE — CARE PLAN
Problem: Safety  Goal: Will remain free from falls  Outcome: PROGRESSING AS EXPECTED  Educated on being a high fall risk because patient has cast to left leg due to fracture and polio to right leg. Patient states he understands that he is a high fall risk and will not attempt to get out of bed. Bed alarm in place      Problem: Urinary Elimination:  Goal: Ability to reestablish a normal urinary elimination pattern will improve  Outcome: PROGRESSING AS EXPECTED      Problem: Skin Integrity  Goal: Risk for impaired skin integrity will decrease  Outcome: PROGRESSING SLOWER THAN EXPECTED    Intervention: Assess risk factors for impaired skin integrity and/or pressure ulcers  Patient is high risk for skin break down. Redness noted on bottom, he is a q2h turn, family reports poor appetite, patient educated on importance of balanced diet and healing. Verbalizes understanding

## 2017-12-14 NOTE — PROGRESS NOTES
Cardiology Progress Note               Author: Vika Robles Date & Time created: 2017  11:40 AM     Interval History:   patient denied any complaints of chest pain pressure or tightness. No dyspnea.   No acute overnight events denied any complaints of chest pain pressure or tightness patient is bedridden due to left ankle fracture pretty much sedentary lifestyle.    Telemetry: Sinus rhythm    Review of Systems   Constitutional: Negative.    Eyes: Negative.    Respiratory: Negative.    Cardiovascular: Negative.    Gastrointestinal: Negative.    Genitourinary: Negative.    Musculoskeletal: Negative.         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    Neurological: Negative.    Psychiatric/Behavioral: Negative.        Physical Exam   Constitutional: He is oriented to person, place, and time.   Elderly frail   HENT:   Head: Normocephalic and atraumatic.   Mouth/Throat: No oropharyngeal exudate.   Eyes: Conjunctivae are normal. Pupils are equal, round, and reactive to light. Right eye exhibits no discharge. Left eye exhibits no discharge.   Neck: No JVD present. No tracheal deviation present.   Cardiovascular: Regular rhythm.    No murmur heard.  Pulmonary/Chest: Effort normal and breath sounds normal. No respiratory distress. He has no wheezes. He has no rales.   Abdominal: Soft. Bowel sounds are normal. He exhibits no distension. There is no tenderness. There is no rebound.   Musculoskeletal: Normal range of motion. He exhibits no edema.   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   Skin: Skin is warm and dry. No erythema.   Psychiatric: He has a normal mood and affect.       Hemodynamics:  Temp (24hrs), Av.1 °C (98.8 °F),  Min:36.7 °C (98 °F), Max:37.7 °C (99.8 °F)  Temperature: 36.9 °C (98.4 °F)  Pulse  Av.3  Min: 67  Max: 92   Blood Pressure : 101/60     Respiratory:    Respiration: 18, Pulse Oximetry: 95 %        RUL Breath Sounds: Clear, RML Breath Sounds: Clear, RLL Breath Sounds: Clear, CHERELLE Breath Sounds: Clear, LLL Breath Sounds: Clear  Fluids:     Weight: 64.8 kg (142 lb 13.7 oz)  GI/Nutrition:  Orders Placed This Encounter   Procedures   • DIET ORDER     Standing Status:   Standing     Number of Occurrences:   1     Order Specific Question:   Diet:     Answer:   Cardiac [6]   • DIET NPO     Standing Status:   Standing     Number of Occurrences:   8     Order Specific Question:   Restrict to:     Answer:   Sips with Medications [3]     Lab Results:  Recent Labs      17   1325  17   0214  17   0303   WBC  7.6  7.3  5.7   RBC  4.64*  4.40*  4.12*   HEMOGLOBIN  14.3  13.3*  12.5*   HEMATOCRIT  43.0  40.7*  37.2*   MCV  92.7  92.5  90.3   MCH  30.8  30.2  30.3   MCHC  33.3*  32.7*  33.6*   RDW  46.5  46.3  45.4   PLATELETCT  276  273  290   MPV  9.8  9.7  9.9     Recent Labs      17   1606  17   0214  17   0303   SODIUM  136  137  138   POTASSIUM  4.2  3.7  3.5*   CHLORIDE  107  109  111   CO2  22  17*  21   GLUCOSE  94  79  105*   BUN  34*  32*  26*   CREATININE  0.90  0.80  0.70   CALCIUM  8.2*  8.0*  8.3*     Recent Labs      17   1325  17   0214  17   0831   APTT  37.7*  74.1*  34.7   INR  1.16*   --    --      Recent Labs      17   1325   BNPBTYPENAT  357*     Recent Labs      17   1325  17   0214  17   0536  17   0831   TROPONINI  2.37*  1.35*  1.14*  0.94*   BNPBTYPENAT  357*   --    --    --      Recent Labs      17   0214   TRIGLYCERIDE  73   HDL  29*   LDL  40   CT PE study: 17  1. No CT evidence of pulmonary embolism.  2. Small bilateral pleural effusions.  3. Patchy opacities in the bilateral lung bases, left more than  right, atelectasis versus consolidation    Echo images personally reviewed by me which shows normal LV function as well as wall motion.    Chest Xray:  Reviewed  1.  Pulmonary vascular congestion and probable minimal edema.  2.  Mild LEFT lung base atelectasis and probable small effusion.  3.  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 According to patient's son patient was not himself yesterday and due to low blood pressure he was sent over here for further evaluation.      Elevated troponin:  CT PE study is negative.  Heparin was discontinued yesterday.  Echo shows reasonably normal wall motion.Family agreeable for nuclear stress test even if stress test shows reversible defect plan is to medically manage the patient.  Interim start aspirin 81 mg daily.  Start Lipitor 10 mg qHs.  Secondary to low blood pressure will hold off on initiating beta blockers.     If family is agreeable okay to do stress test on outpatient basis.    Pneumonia  Continue antibiotics per primary team    Thank you for allowing me to participate in taking care of patient.     Vika Cornell MD    [employer]

## 2017-12-14 NOTE — PROGRESS NOTES
Patient has been npo since midnight per md order. Condom cath in place, remains q2h, dressing changed to buttock. Wound care consult put in due to non blanchable redness. Patient remained alert and oriented just confused about the date. Blood pressure in the 90s, remains on iv fluids, denies dizziness, chest pain

## 2017-12-14 NOTE — PROGRESS NOTES
Patient's stress test scheduled for 12/15/17, but patient NPO after midnight.  Per Dr. Rosen, schedule patient for stress test today.  Called nuclear medicine and per RN, no availability for patient to have stress test today.

## 2017-12-14 NOTE — PROGRESS NOTES
Bedside report received, pt care assumed, tele box on.  Pt sleeping at this time with no signs of distress or discomfort. Bed in lowest position, bed alarm on, call light within reach.

## 2017-12-14 NOTE — CARE PLAN
Problem: Safety  Goal: Will remain free from injury  Outcome: PROGRESSING AS EXPECTED  Fall precautions in place. Treaded socks on pt. Appropriate signs on doorway. IV pole on same side as bathroom. Bedrails up. Bed in lowest position and locked.  Call light and phone within reach. Patient educated on importance of calling nurses before getting out of bed, verbalizes understanding. Bed alarm active    Problem: Knowledge Deficit  Goal: Knowledge of disease process/condition, treatment plan, diagnostic tests, and medications will improve  Outcome: PROGRESSING AS EXPECTED  Patient  educated about POC.  All questions answered in regards to disease process, treatment, and diet.  Patient verbalized understanding and voiced no further questions at this time.

## 2017-12-15 ENCOUNTER — APPOINTMENT (OUTPATIENT)
Dept: RADIOLOGY | Facility: MEDICAL CENTER | Age: 82
DRG: 190 | End: 2017-12-15
Attending: NURSE PRACTITIONER
Payer: MEDICARE

## 2017-12-15 PROBLEM — J96.01 ACUTE HYPOXEMIC RESPIRATORY FAILURE (HCC): Status: ACTIVE | Noted: 2017-12-15

## 2017-12-15 LAB
ALBUMIN SERPL BCP-MCNC: 2.5 G/DL (ref 3.2–4.9)
ALBUMIN/GLOB SERPL: 1.1 G/DL
ALP SERPL-CCNC: 49 U/L (ref 30–99)
ALT SERPL-CCNC: 37 U/L (ref 2–50)
ANION GAP SERPL CALC-SCNC: 8 MMOL/L (ref 0–11.9)
ANISOCYTOSIS BLD QL SMEAR: ABNORMAL
AST SERPL-CCNC: 38 U/L (ref 12–45)
BASOPHILS # BLD AUTO: 1.8 % (ref 0–1.8)
BASOPHILS # BLD: 0.11 K/UL (ref 0–0.12)
BILIRUB SERPL-MCNC: 0.3 MG/DL (ref 0.1–1.5)
BUN SERPL-MCNC: 23 MG/DL (ref 8–22)
BURR CELLS BLD QL SMEAR: NORMAL
CALCIUM SERPL-MCNC: 8.1 MG/DL (ref 8.5–10.5)
CHLORIDE SERPL-SCNC: 108 MMOL/L (ref 96–112)
CO2 SERPL-SCNC: 24 MMOL/L (ref 20–33)
CREAT SERPL-MCNC: 0.56 MG/DL (ref 0.5–1.4)
EOSINOPHIL # BLD AUTO: 0 K/UL (ref 0–0.51)
EOSINOPHIL NFR BLD: 0 % (ref 0–6.9)
ERYTHROCYTE [DISTWIDTH] IN BLOOD BY AUTOMATED COUNT: 45.3 FL (ref 35.9–50)
GFR SERPL CREATININE-BSD FRML MDRD: >60 ML/MIN/1.73 M 2
GLOBULIN SER CALC-MCNC: 2.3 G/DL (ref 1.9–3.5)
GLUCOSE SERPL-MCNC: 97 MG/DL (ref 65–99)
HCT VFR BLD AUTO: 37.2 % (ref 42–52)
HGB BLD-MCNC: 12.7 G/DL (ref 14–18)
LYMPHOCYTES # BLD AUTO: 0.79 K/UL (ref 1–4.8)
LYMPHOCYTES NFR BLD: 12.5 % (ref 22–41)
MAGNESIUM SERPL-MCNC: 1.7 MG/DL (ref 1.5–2.5)
MANUAL DIFF BLD: NORMAL
MCH RBC QN AUTO: 30.8 PG (ref 27–33)
MCHC RBC AUTO-ENTMCNC: 34.1 G/DL (ref 33.7–35.3)
MCV RBC AUTO: 90.1 FL (ref 81.4–97.8)
METAMYELOCYTES NFR BLD MANUAL: 0.9 %
MICROCYTES BLD QL SMEAR: ABNORMAL
MONOCYTES # BLD AUTO: 0.67 K/UL (ref 0–0.85)
MONOCYTES NFR BLD AUTO: 10.7 % (ref 0–13.4)
MORPHOLOGY BLD-IMP: NORMAL
NEUTROPHILS # BLD AUTO: 4.67 K/UL (ref 1.82–7.42)
NEUTROPHILS NFR BLD: 74.1 % (ref 44–72)
NRBC # BLD AUTO: 0 K/UL
NRBC BLD AUTO-RTO: 0 /100 WBC
PLATELET # BLD AUTO: 316 K/UL (ref 164–446)
PLATELET BLD QL SMEAR: NORMAL
PMV BLD AUTO: 10 FL (ref 9–12.9)
POIKILOCYTOSIS BLD QL SMEAR: NORMAL
POTASSIUM SERPL-SCNC: 3.8 MMOL/L (ref 3.6–5.5)
PROT SERPL-MCNC: 4.8 G/DL (ref 6–8.2)
RBC # BLD AUTO: 4.13 M/UL (ref 4.7–6.1)
RBC BLD AUTO: PRESENT
SODIUM SERPL-SCNC: 140 MMOL/L (ref 135–145)
WBC # BLD AUTO: 6.3 K/UL (ref 4.8–10.8)

## 2017-12-15 PROCEDURE — 83735 ASSAY OF MAGNESIUM: CPT

## 2017-12-15 PROCEDURE — 80053 COMPREHEN METABOLIC PANEL: CPT

## 2017-12-15 PROCEDURE — 700102 HCHG RX REV CODE 250 W/ 637 OVERRIDE(OP): Performed by: HOSPITALIST

## 2017-12-15 PROCEDURE — 99232 SBSQ HOSP IP/OBS MODERATE 35: CPT | Performed by: HOSPITALIST

## 2017-12-15 PROCEDURE — 97161 PT EVAL LOW COMPLEX 20 MIN: CPT

## 2017-12-15 PROCEDURE — A9502 TC99M TETROFOSMIN: HCPCS

## 2017-12-15 PROCEDURE — 700111 HCHG RX REV CODE 636 W/ 250 OVERRIDE (IP): Performed by: HOSPITALIST

## 2017-12-15 PROCEDURE — A9270 NON-COVERED ITEM OR SERVICE: HCPCS | Performed by: INTERNAL MEDICINE

## 2017-12-15 PROCEDURE — 700102 HCHG RX REV CODE 250 W/ 637 OVERRIDE(OP): Performed by: INTERNAL MEDICINE

## 2017-12-15 PROCEDURE — 770020 HCHG ROOM/CARE - TELE (206)

## 2017-12-15 PROCEDURE — 36415 COLL VENOUS BLD VENIPUNCTURE: CPT

## 2017-12-15 PROCEDURE — 700111 HCHG RX REV CODE 636 W/ 250 OVERRIDE (IP)

## 2017-12-15 PROCEDURE — G8990 OTHER PT/OT CURRENT STATUS: HCPCS

## 2017-12-15 PROCEDURE — A9270 NON-COVERED ITEM OR SERVICE: HCPCS | Performed by: HOSPITALIST

## 2017-12-15 PROCEDURE — G8991 OTHER PT/OT GOAL STATUS: HCPCS

## 2017-12-15 PROCEDURE — 85007 BL SMEAR W/DIFF WBC COUNT: CPT

## 2017-12-15 PROCEDURE — 85027 COMPLETE CBC AUTOMATED: CPT

## 2017-12-15 RX ORDER — REGADENOSON 0.08 MG/ML
INJECTION, SOLUTION INTRAVENOUS
Status: COMPLETED
Start: 2017-12-15 | End: 2017-12-15

## 2017-12-15 RX ADMIN — VITAMIN D, TAB 1000IU (100/BT) 2000 UNITS: 25 TAB at 09:58

## 2017-12-15 RX ADMIN — BUDESONIDE AND FORMOTEROL FUMARATE DIHYDRATE 2 PUFF: 160; 4.5 AEROSOL RESPIRATORY (INHALATION) at 09:58

## 2017-12-15 RX ADMIN — BUDESONIDE AND FORMOTEROL FUMARATE DIHYDRATE 2 PUFF: 160; 4.5 AEROSOL RESPIRATORY (INHALATION) at 20:51

## 2017-12-15 RX ADMIN — TAMSULOSIN HYDROCHLORIDE 0.4 MG: 0.4 CAPSULE ORAL at 09:58

## 2017-12-15 RX ADMIN — CEFTRIAXONE 2 G: 2 INJECTION, POWDER, FOR SOLUTION INTRAMUSCULAR; INTRAVENOUS at 20:51

## 2017-12-15 RX ADMIN — REGADENOSON 0.4 MG: 0.08 INJECTION, SOLUTION INTRAVENOUS at 08:48

## 2017-12-15 RX ADMIN — HEPARIN SODIUM 5000 UNITS: 5000 INJECTION, SOLUTION INTRAVENOUS; SUBCUTANEOUS at 20:51

## 2017-12-15 RX ADMIN — OMEPRAZOLE 20 MG: 20 CAPSULE, DELAYED RELEASE ORAL at 09:58

## 2017-12-15 RX ADMIN — THERA TABS 1 TABLET: TAB at 09:58

## 2017-12-15 RX ADMIN — HEPARIN SODIUM 5000 UNITS: 5000 INJECTION, SOLUTION INTRAVENOUS; SUBCUTANEOUS at 05:14

## 2017-12-15 RX ADMIN — ATORVASTATIN CALCIUM 10 MG: 10 TABLET, FILM COATED ORAL at 20:51

## 2017-12-15 RX ADMIN — DOXYCYCLINE 100 MG: 100 TABLET ORAL at 09:58

## 2017-12-15 RX ADMIN — DOXYCYCLINE 100 MG: 100 TABLET ORAL at 20:51

## 2017-12-15 ASSESSMENT — ENCOUNTER SYMPTOMS
CONSTITUTIONAL NEGATIVE: 1
GASTROINTESTINAL NEGATIVE: 1
WEAKNESS: 1
MUSCULOSKELETAL NEGATIVE: 1
MEMORY LOSS: 0
HEADACHES: 0
HEMOPTYSIS: 0
DEPRESSION: 0
DIZZINESS: 0
BLURRED VISION: 0
ORTHOPNEA: 0
MYALGIAS: 0
NEUROLOGICAL NEGATIVE: 1
SHORTNESS OF BREATH: 0
BLOOD IN STOOL: 0
COUGH: 0
NECK PAIN: 0
DOUBLE VISION: 0
SPUTUM PRODUCTION: 0
PALPITATIONS: 0
STRIDOR: 0
TREMORS: 0
EYES NEGATIVE: 1
SENSORY CHANGE: 0
CLAUDICATION: 0
CHILLS: 0
PSYCHIATRIC NEGATIVE: 1
CARDIOVASCULAR NEGATIVE: 1
SPEECH CHANGE: 0
NAUSEA: 0
HEARTBURN: 0
BACK PAIN: 0
PHOTOPHOBIA: 0
VOMITING: 0
TINGLING: 0
SORE THROAT: 0
RESPIRATORY NEGATIVE: 1
CONSTIPATION: 0
PND: 0
EYE PAIN: 0
FEVER: 0
NERVOUS/ANXIOUS: 0

## 2017-12-15 ASSESSMENT — PAIN SCALES - GENERAL
PAINLEVEL_OUTOF10: 0

## 2017-12-15 NOTE — DISCHARGE PLANNING
[facility] has tried calling [facility] and left several message for admissions to check on the bed for this patient. Sw on floor has been notified.

## 2017-12-15 NOTE — CARE PLAN
Problem: Nutritional:  Goal: Achieve adequate nutritional intake  Patient will consume 50% of meals   Outcome: MET Date Met: 12/15/17  PO for last three meals %.

## 2017-12-15 NOTE — PROGRESS NOTES
Bedside report received. Discussed pt hx, current status and POC. Pt awake, alert, resting in bed. Unlabored breathing with O2 via NC in place. Pt denies pain/discomfort. No other needs at this time. Bed in low/locked position, bed alarm on. Call bell within reach, encouraged to call with needs.     Pt transferred to nuc-med for stress test via [facility]Dilliner.

## 2017-12-15 NOTE — DISCHARGE PLANNING
Medical Social Work    Reviewed pt's previous chart, pt was discharged on 12/05 to [facility] on an CANDY due to being under observation and not meeting inpatient criteria through Medicare.     Per Erin at [facility], they will consider CANDY, however pt was inpatient during this admission and meets the 3 midnight stay criteria through Medicare. Erin stated they will need PT/OT evals. SW requested bedside RN put in PT/OT order.     Requested CCS send referral to [facility], as CANDY is good for 30 days. CCS left  for [facility] to see if they have a bed for pt today.

## 2017-12-15 NOTE — PROGRESS NOTES
Cardiology Progress Note               Author: Vika Margaret Date & Time created: 12/15/2017  11:30 AM     Interval History:  12/13 patient denied any complaints of chest pain pressure or tightness. No dyspnea.  12/14 No acute overnight events denied any complaints of chest pain pressure or tightness patient is bedridden due to left ankle fracture pretty much sedentary lifestyle.  12/15 no acute overnight events.    Telemetry: Sinus rhythm    Review of Systems   Constitutional: Negative.    Eyes: Negative.    Respiratory: Negative.    Cardiovascular: Negative.    Gastrointestinal: Negative.    Genitourinary: Negative.    Musculoskeletal: Negative.         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    Neurological: Negative.    Psychiatric/Behavioral: Negative.        Physical Exam   Constitutional: He is oriented to person, place, and time.   Elderly frail   HENT:   Head: Normocephalic and atraumatic.   Mouth/Throat: No oropharyngeal exudate.   Eyes: Conjunctivae are normal. Pupils are equal, round, and reactive to light. Right eye exhibits no discharge. Left eye exhibits no discharge.   Neck: No JVD present. No tracheal deviation present.   Cardiovascular: Regular rhythm.    No murmur heard.  Pulmonary/Chest: Effort normal and breath sounds normal. No respiratory distress. He has no wheezes. He has no rales.   Abdominal: Soft. Bowel sounds are normal. He exhibits no distension. There is no tenderness. There is no rebound.   Musculoskeletal: Normal range of motion. He exhibits no edema.   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   Skin: Skin is warm and dry. No erythema.   Psychiatric: He has a normal mood and affect.       Hemodynamics:  Temp  (24hrs), Av.7 °C (98.1 °F), Min:36.6 °C (97.8 °F), Max:36.8 °C (98.3 °F)  Temperature: 36.6 °C (97.8 °F)  Pulse  Av  Min: 67  Max: 92   Blood Pressure : 116/66     Respiratory:    Respiration: 18, Pulse Oximetry: 95 %        RUL Breath Sounds: Clear, RML Breath Sounds: Clear, RLL Breath Sounds: Clear, CHERELLE Breath Sounds: Clear, LLL Breath Sounds: Clear  Fluids:     Weight: 65 kg (143 lb 4.8 oz)  GI/Nutrition:  Orders Placed This Encounter   Procedures   • DIET NPO     Standing Status:   Standing     Number of Occurrences:   8     Order Specific Question:   Restrict to:     Answer:   Sips with Medications [3]     Lab Results:  Recent Labs      17   0303  12/15/17   0221   WBC  7.3  5.7  6.3   RBC  4.40*  4.12*  4.13*   HEMOGLOBIN  13.3*  12.5*  12.7*   HEMATOCRIT  40.7*  37.2*  37.2*   MCV  92.5  90.3  90.1   MCH  30.2  30.3  30.8   MCHC  32.7*  33.6*  34.1   RDW  46.3  45.4  45.3   PLATELETCT  273  290  316   MPV  9.7  9.9  10.0     Recent Labs      17   02117   0303  12/15/17   0221   SODIUM  137  138  140   POTASSIUM  3.7  3.5*  3.8   CHLORIDE  109  111  108   CO2  17*  21  24   GLUCOSE  79  105*  97   BUN  32*  26*  23*   CREATININE  0.80  0.70  0.56   CALCIUM  8.0*  8.3*  8.1*     Recent Labs      17   1325  17   0214  17   0831   APTT  37.7*  74.1*  34.7   INR  1.16*   --    --      Recent Labs      17   1325   BNPBTYPENAT  357*     Recent Labs      17   1325  17   0214  17   0536  17   0831   TROPONINI  2.37*  1.35*  1.14*  0.94*   BNPBTYPENAT  357*   --    --    --      Recent Labs      17   0214   TRIGLYCERIDE  73   HDL  29*   LDL  40     TTE: 17  Normal left ventricular systolic function.  Left ventricular ejection fraction is visually estimated to be 70%.  Normal regional wall motion.  Trace mitral regurgitation.  No pericardial effusion seen.  Normal inferior vena cava size and inspiratory  collapse.  Normal right ventricular size and systolic function.  Compared to the images of the prior study done 9/22/09, image quality is much better now, probably no signficant change    Nuclear stress test: 12/15/17  No myocardial infarct or reversible ischemia.  Normal LEFT ventricular function.    CT PE study: 12/12/17  1. No CT evidence of pulmonary embolism.  2. Small bilateral pleural effusions.  3. Patchy opacities in the bilateral lung bases, left more than right, atelectasis versus consolidation    Echo images personally reviewed by me which shows normal LV function as well as wall motion.    Chest Xray:  Reviewed  1.  Pulmonary vascular congestion and probable minimal edema.  2.  Mild LEFT lung base atelectasis and probable small effusion.  3.  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 According to patient's son patient was not himself yesterday and due to low blood pressure he was sent over here for further evaluation.      Elevated troponin:  CT PE study is negative.  Echo showed normal LV function and wall motion   Nuc stress test -ve ischemic no further  Cardiac work up needed.  Continue current meds    Pneumonia  Continue antibiotics per primary team    Will sign off call us back with any further questions.      Thank you for allowing me to participate in taking care of patient.     Vika Cornell MD    Quality-[employer] Measures

## 2017-12-15 NOTE — ASSESSMENT & PLAN NOTE
2.2 to pneumonia,  Resolved, continue IV abx therapy   Continue RT protocol, duo nebs, Pep therapy if warranted, and incentive spirometry.

## 2017-12-15 NOTE — WOUND TEAM
[facility]  Inpatient Services  Initial Wound & Skin Care Evaluation  visit  G-code  C-code  kx modifier     1 on  12/15/17  [id number]         Admission Date:   12/12/17  Consult Date:    12/15/17  HPI, PMH, SH: Reviewed  Unit where seen by Wound Team: T [id number]    WOUND CONSULT RELATED TO: buttocks wounds    SUBJECTIVE:  'I was on the bed pan just awhile ago'    Self Report / Pain Level: 0/10    OBJECTIVE: Pt in bed supine on a waffle mattress legs elevated on pillows  WOUND TYPE, LOCATION, CHARACTERISTICS (Pressure ulcers: location, stage, POA or date identified)    Wound Type/Location:  Left medial buttock stage 1 pressure injury detected 12/13/17 858 PM    Periwound:    blanchable pink   Drainage:     none  Tissue Type and %:    Non-blanchable red  Wound Edges:    closed  Odor:     none  Exposed structure(s):   none  S&S of Infection:    none  Measurements:   12/15/17  Length:    1.5cm   Width:     1.2cm   Depth:     cm   Tracts/undermining:     Wound Type/Location:  Left buttock crease DTI dectected 12/15/17    Periwound:    intact  Drainage:     none  Tissue Type and %:    Non-blanchable purple  Wound Edges:    closed  Odor:     none  Exposed structure(s):   none  S&S of Infection:    none  Measurements:   12/15/17  Length:    0.4cm   Width:     2.0cm   Depth:     cm   Tracts/undermining:       Wound Type/Location:  Right medial lower leg abrasion from left ankle fibroglass cast     Periwound:    intact  Drainage:     none  Tissue Type and %:    Dry red  Wound Edges:    flat  Odor:     none  Exposed structure(s):   none  S&S of Infection:    none  Measurements:   12/15/17  Length:    14cm   Width:     6cm   Depth:     cm   Tracts/undermining:         INTERVENTIONS BY WOUND TEAM: Pt able to roll to sides with min A.  Buttocks was cleaned with wipes - no stool present.  Pictures and measurments taken.  Skin prep used amelia wound.  Mepilex sacral dressing place to cover left buttock wound.  Left  buttock crease wound left CHIP.  Pt returned to supine and right leg was cleaned with skin wipe.  Skin prep used to protect amelia wound.  mepilex sacral placed over excoriated area. Legs returned to pillows with right heels floated.  Spoke to RN about today's treatment and new Amelia.     Dressing types: foam dressings  Patient educated on rationale for plan of care___X_  Interdisciplinary Collaboration: RN, pt    EVALUATION: Pt with Amelia left buttock related to immobility.  Pressure redistribution measures in place including Q2 hourly turns. Spoke to RN regarding bed pan use and location of gluteal crease wound.  Right leg wound from left leg cast rubbing and the area in now protected.  Nursing order placed for dressing changes and monitoring.      Last Venancio Score by RN:  Factors affecting wound healing: immobility, COPD  Goals: decrease size of compromised area by 1 % in 2 weeks    NURSING PLAN OF CARE: (X)  Dressing changes: See Dressing Maintenance orders: X  Skin care: See Skin Care orders:   Rectal tube care: See Rectal Tube Care orders:   Other orders:    RSKIN: CURRENT (X) ORDERED (O)  Q shift Venancio:  X  Q shift pressure point assessments:  X  Atmosair        JOE      Bariatric JOE      Bariatric foam        Heel float boots       Heels floated on pillows    X  Barrier wipes      Barrier Cream      Barrier paste      Sacral silicone dressing    X  Silicone O2 tubing      Anchorfast      Trach with Optifoam split foam       Waffle mattress X  Rectal tube or BMS      Antifungal tx    Turn q 2 hours X  Up to chair    Ambulate   PT/OT     Dietician      PO     TF   TPN     PVN    NPO   # days   Other       WOUND TEAM PLAN OF CARE (X):   NPWT change 3 x week:        Dressing changes by wound team:       Follow up as needed:     X  Other (explain):    Anticipated discharge plans (X):  SNF:  X         Home Care:           Outpatient Wound Center:            Self Care:            Other:

## 2017-12-15 NOTE — DOCUMENTATION QUERY
DOCUMENTATION QUERY    PROVIDERS: Please select “Cosign w/ note”to reply to query.    To better represent the severity of illness of your patient, please review the following information and exercise your independent professional judgment in responding to this query.     A BMI under 19 is noted in the Dietitian Notes. Based upon the clinical findings, risk factors, and treatment, can a diagnosis be provided to support this finding?    • Underweight  • Cachexia  • Unable to determine  • Findings of no clinical significance  • Other explanation of clinical findings    The medical record reflects the following:   Clinical Findings Per Dietitian Notes:   Nutrition Services:  Poor PO consult / BMI <19 (=18.65)   Treatment Dietitian Consultation, Boost plus supplements, & encourage po intake   Risk Factors Age, COPD, acute respiratory failure, pneumonia, & adult failure to thrive   Location within medical record History & Physical, Progress Notes, & Dietitian Notes     Thank you,   Soledad Menendez RN  Clinical   Phone [phone]

## 2017-12-15 NOTE — DISCHARGE PLANNING
"Staffed case in PM rounds.  Pt should be medically cleared to return to snf tomorrow (12/15/2017).    CHAIM met with pt at bedside to discuss snf.  Pt requested SW contact his daughter Padmini, \"she's in charge.\"  CHAIM facilitated phone contact with Padmini ([phone]).  Padmini has concerns about pt returning to [facility] and would like referrals sent to [facility].    CHAIM faxed choice form to [facility] Katrin.      "

## 2017-12-15 NOTE — PROGRESS NOTES
[facility]    Date of Service: 12/15/2017    Chief Complaint  83 y.o. male admitted 2017 with acute hypoxemic respiratory failure secondary to pneumonia.    Interval Problem Update      No acute issues patient says he is comfortable. Patient denies fevers/chills, chest pain, shortness of breath or nausea/vommiting.   CTA PE neg.  Echocardiogram grossly normal per cardiology  NM Stress test pending despite even if he had positive findings, would be treated medically.    12/15  No acute issues, patient is doing well, denies having acute complains. Patient denies fevers/chills, chest pain, shortness of breath or nausea/vommiting.   NM Stress test negative.   Cardiology signed off  Pending placement    Consultants/Specialty  Cardiology    Disposition  TBD      Grossly unchanged  Review of Systems   Constitutional: Positive for malaise/fatigue. Negative for chills and fever.   HENT: Negative for congestion, hearing loss, sore throat and tinnitus.    Eyes: Negative for blurred vision, double vision, photophobia and pain.   Respiratory: Negative for cough, hemoptysis, sputum production, shortness of breath and stridor.    Cardiovascular: Negative for chest pain, palpitations, orthopnea, claudication and PND.   Gastrointestinal: Negative for blood in stool, constipation, heartburn, melena, nausea and vomiting.   Genitourinary: Negative for dysuria, frequency and urgency.   Musculoskeletal: Negative for back pain, myalgias and neck pain.   Neurological: Positive for weakness. Negative for dizziness, tingling, tremors, sensory change, speech change and headaches.   Psychiatric/Behavioral: Negative for depression, memory loss and suicidal ideas. The patient is not nervous/anxious.       Physical Exam  Laboratory/Imaging   Hemodynamics  Temp (24hrs), Av.7 °C (98 °F), Min:36.6 °C (97.8 °F), Max:36.8 °C (98.3 °F)   Temperature: 36.6 °C (97.8 °F)  Pulse  Av  Min: 67  Max: 92    Blood  Pressure : 116/66      Respiratory      Respiration: 18, Pulse Oximetry: 95 %        RUL Breath Sounds: Clear, RML Breath Sounds: Clear, RLL Breath Sounds: Clear, CHERELLE Breath Sounds: Clear, LLL Breath Sounds: Clear    Fluids    Intake/Output Summary (Last 24 hours) at 12/15/17 1551  Last data filed at 12/14/17 2100   Gross per 24 hour   Intake              600 ml   Output              750 ml   Net             -150 ml       Nutrition  Orders Placed This Encounter   Procedures   • DIET ORDER     Standing Status:   Standing     Number of Occurrences:   1     Order Specific Question:   Diet:     Answer:   Cardiac [6]   grossly unchanged  Physical Exam   Constitutional: He is oriented to person, place, and time. He appears well-developed and well-nourished.   HENT:   Head: Normocephalic and atraumatic.   Mouth/Throat: No oropharyngeal exudate.   Eyes: Conjunctivae are normal. Pupils are equal, round, and reactive to light. Right eye exhibits no discharge. No scleral icterus.   Neck: Neck supple. No JVD present. No thyromegaly present.   Cardiovascular: Normal rate and intact distal pulses.    No murmur heard.  Pulmonary/Chest: Effort normal. No stridor. No respiratory distress. He has no wheezes. He has no rales.   Abdominal: Soft. Bowel sounds are normal. He exhibits no distension. There is no tenderness. There is no rebound.   Musculoskeletal: Normal range of motion. He exhibits no edema.   Neurological: He is alert and oriented to person, place, and time. No cranial nerve deficit.   Skin: Skin is warm. No erythema.   Psychiatric: He has a normal mood and affect. His behavior is normal. Thought content normal.       Recent Labs      12/13/17   0214  12/14/17   0303  12/15/17   0221   WBC  7.3  5.7  6.3   RBC  4.40*  4.12*  4.13*   HEMOGLOBIN  13.3*  12.5*  12.7*   HEMATOCRIT  40.7*  37.2*  37.2*   MCV  92.5  90.3  90.1   MCH  30.2  30.3  30.8   MCHC  32.7*  33.6*  34.1   RDW  46.3  45.4  45.3   PLATELETCT  273  290  316    MPV  9.7  9.9  10.0     Recent Labs      12/13/17   0214  12/14/17   0303  12/15/17   0221   SODIUM  137  138  140   POTASSIUM  3.7  3.5*  3.8   CHLORIDE  109  111  108   CO2  17*  21  24   GLUCOSE  79  105*  97   BUN  32*  26*  23*   CREATININE  0.80  0.70  0.56   CALCIUM  8.0*  8.3*  8.1*     Recent Labs      12/13/17   0214  12/13/17   0831   APTT  74.1*  34.7         Recent Labs      12/13/17   0214   TRIGLYCERIDE  73   HDL  29*   LDL  40          Assessment/Plan     Failure to thrive in adult- (present on admission)   Assessment & Plan    Pending PT/OT        Pneumonia- (present on admission)   Assessment & Plan    CT showed possible pneumonia.  Pro calcitonin elevated.  Continue IV Ceftriaxone/Doxycyline x 7 days   Cultures neg thus far.  Trend pro calcitonin levels.          COPD (chronic obstructive pulmonary disease) (CMS-HCC)- (present on admission)   Assessment & Plan    Not in Acute exacerbation.  Continue RT protocol, duo nebs, Pep therapy if warranted, and incentive spirometry.   Wean oxygen as tolerated            Elevated troponin- (present on admission)   Assessment & Plan    Denies chest pain.  Possibly 2/2 to demand ischemia?  CTA PE neg but does show possible pneumonia  troponin trending down.  D/c heparin gtt as CTA PE neg for PE.  Echocardiogram grossly normal.  NM Stress test negative.   Cardiology signed off  Pending placement          Patient plan of care discussed at multidisplinary team rounds and with patient and R.N at beside.      Reviewed items::  Labs reviewed, Medications reviewed and Radiology images reviewed  Bedolla catheter::  No Bedolla  DVT prophylaxis pharmacological::  Heparin  Antibiotics:  Treating active infection/contamination beyond 24 hours perioperative coverage

## 2017-12-15 NOTE — DISCHARGE PLANNING
CCS spoke to Eirn the [facility] Liaison. The referral was not received CCS re-faxed the SNF referral to [facility]

## 2017-12-15 NOTE — PROGRESS NOTES
Nursing care plan includes knowledge deficit, potential for discomfort, potential for compromised cardiac output. POC includes teaching, comfort measures and reassurance, and access to code cart, cardiology stand by and availability of rapid response team. Pt verbalizes good understanding of benefits and risks of pharmacological cardiac stress test. Informed consent obtained. Lexiscan given, pt developed the following symptoms SOB and chest pressure. VS stable, major symptoms resolved. Nursing goals met.

## 2017-12-15 NOTE — PROGRESS NOTES
Bedside report received from dada tee, patient alert and oriented with family at bedside, questions answered appropriately, call light and personal items within reach, bed alarm in place. No distress noted, patient and family informed of possible stress test tomorrow and patient is to not consume fluids or solids after midnight for testing tomorrow. Family and patient verbalized understanding. Will encourage p.o intake up until midnight

## 2017-12-15 NOTE — CARE PLAN
Problem: Infection  Goal: Will remain free from infection  Outcome: PROGRESSING AS EXPECTED    Intervention: Assess signs and symptoms of infection  Labs monitored per md order,  No fevers noted, remains on abt, will continue to monitor for any changes, no confusion noted        Problem: Skin Integrity  Goal: Risk for impaired skin integrity will decrease  Outcome: PROGRESSING SLOWER THAN EXPECTED    Intervention: Assess risk factors for impaired skin integrity and/or pressure ulcers  Patient remains q2h turn, has non blanchable redness to both sides of buttocks, mepilex in place for cushioning

## 2017-12-15 NOTE — DISCHARGE PLANNING
CCS received notification from [facility] the patient can be considered fro an [facility] if the patient james not meet MCR criteria. [facility] is requesting therapy notes. SW on floor has been notified.

## 2017-12-15 NOTE — DISCHARGE PLANNING
CCS finally was able to reach admissions at [facility]. CCS spoke to Melody who stated that she would call the adminstrator and call this CCS back.

## 2017-12-15 NOTE — PROGRESS NOTES
Patient rested quietly throughout the night, no chest pain, palpitations, or sob noted, has been npo since midnight for possible stress test today, vss, call light personal items within reach, condom cath changed patient tolerated without difficulty. Laxative held last night because patient has been having lose stools. Remains on iv abt

## 2017-12-16 VITALS
HEART RATE: 88 BPM | SYSTOLIC BLOOD PRESSURE: 105 MMHG | RESPIRATION RATE: 18 BRPM | DIASTOLIC BLOOD PRESSURE: 61 MMHG | OXYGEN SATURATION: 92 % | BODY MASS INDEX: 21.81 KG/M2 | HEIGHT: 70 IN | WEIGHT: 152.34 LBS | TEMPERATURE: 97.6 F

## 2017-12-16 PROBLEM — J96.01 ACUTE HYPOXEMIC RESPIRATORY FAILURE (HCC): Status: RESOLVED | Noted: 2017-12-15 | Resolved: 2017-12-16

## 2017-12-16 PROBLEM — J18.9 PNEUMONIA: Status: RESOLVED | Noted: 2017-12-13 | Resolved: 2017-12-16

## 2017-12-16 PROBLEM — R79.89 ELEVATED TROPONIN: Status: RESOLVED | Noted: 2017-12-12 | Resolved: 2017-12-16

## 2017-12-16 LAB — PROCALCITONIN SERPL-MCNC: 0.18 NG/ML

## 2017-12-16 PROCEDURE — 700111 HCHG RX REV CODE 636 W/ 250 OVERRIDE (IP): Performed by: HOSPITALIST

## 2017-12-16 PROCEDURE — 84145 PROCALCITONIN (PCT): CPT

## 2017-12-16 PROCEDURE — 36415 COLL VENOUS BLD VENIPUNCTURE: CPT

## 2017-12-16 PROCEDURE — 700102 HCHG RX REV CODE 250 W/ 637 OVERRIDE(OP): Performed by: HOSPITALIST

## 2017-12-16 PROCEDURE — 700102 HCHG RX REV CODE 250 W/ 637 OVERRIDE(OP): Performed by: INTERNAL MEDICINE

## 2017-12-16 PROCEDURE — 700105 HCHG RX REV CODE 258: Performed by: HOSPITALIST

## 2017-12-16 PROCEDURE — 99239 HOSP IP/OBS DSCHRG MGMT >30: CPT | Performed by: HOSPITALIST

## 2017-12-16 PROCEDURE — A9270 NON-COVERED ITEM OR SERVICE: HCPCS | Performed by: INTERNAL MEDICINE

## 2017-12-16 PROCEDURE — A9270 NON-COVERED ITEM OR SERVICE: HCPCS | Performed by: HOSPITALIST

## 2017-12-16 RX ORDER — TAMSULOSIN HYDROCHLORIDE 0.4 MG/1
0.4 CAPSULE ORAL DAILY
Qty: 90 CAP | Refills: 0 | Status: SHIPPED | OUTPATIENT
Start: 2017-12-17 | End: 2017-12-16

## 2017-12-16 RX ORDER — OXYCODONE HYDROCHLORIDE 5 MG/1
5 TABLET ORAL EVERY 4 HOURS PRN
Qty: 30 TAB | Refills: 0 | Status: SHIPPED | OUTPATIENT
Start: 2017-12-16 | End: 2017-12-21

## 2017-12-16 RX ORDER — AMOXICILLIN 250 MG
2 CAPSULE ORAL 2 TIMES DAILY
Qty: 360 TAB | Refills: 0 | Status: SHIPPED | OUTPATIENT
Start: 2017-12-16 | End: 2018-03-16

## 2017-12-16 RX ORDER — TAMSULOSIN HYDROCHLORIDE 0.4 MG/1
0.4 CAPSULE ORAL DAILY
Qty: 90 CAP | Refills: 0 | Status: SHIPPED | OUTPATIENT
Start: 2017-12-17 | End: 2018-03-17

## 2017-12-16 RX ORDER — DOXYCYCLINE 100 MG/1
100 TABLET ORAL EVERY 12 HOURS
Qty: 4 TAB | Refills: 0 | Status: SHIPPED | OUTPATIENT
Start: 2017-12-16 | End: 2017-12-18

## 2017-12-16 RX ORDER — DOCUSATE SODIUM 100 MG/1
100 CAPSULE, LIQUID FILLED ORAL 2 TIMES DAILY
Qty: 180 CAP | Refills: 0 | Status: SHIPPED | OUTPATIENT
Start: 2017-12-16 | End: 2018-03-16

## 2017-12-16 RX ORDER — ACLIDINIUM BROMIDE 400 UG/1
1 POWDER, METERED RESPIRATORY (INHALATION) 2 TIMES DAILY
Qty: 3 EACH | Refills: 1 | Status: SHIPPED | OUTPATIENT
Start: 2017-12-16 | End: 2018-03-16

## 2017-12-16 RX ORDER — BUDESONIDE AND FORMOTEROL FUMARATE DIHYDRATE 160; 4.5 UG/1; UG/1
2 AEROSOL RESPIRATORY (INHALATION) 2 TIMES DAILY
Qty: 3 INHALER | Refills: 1 | Status: SHIPPED | OUTPATIENT
Start: 2017-12-16 | End: 2018-03-16

## 2017-12-16 RX ORDER — BUDESONIDE AND FORMOTEROL FUMARATE DIHYDRATE 160; 4.5 UG/1; UG/1
2 AEROSOL RESPIRATORY (INHALATION) 2 TIMES DAILY
Qty: 3 INHALER | Refills: 1 | Status: SHIPPED | OUTPATIENT
Start: 2017-12-16 | End: 2017-12-16

## 2017-12-16 RX ORDER — DOXYCYCLINE 100 MG/1
100 TABLET ORAL EVERY 12 HOURS
Qty: 4 TAB | Refills: 0 | Status: SHIPPED | OUTPATIENT
Start: 2017-12-16 | End: 2017-12-16

## 2017-12-16 RX ORDER — ACETAMINOPHEN 325 MG/1
650 TABLET ORAL EVERY 6 HOURS PRN
Qty: 30 TAB | Refills: 0 | Status: SHIPPED | OUTPATIENT
Start: 2017-12-16 | End: 2018-03-16

## 2017-12-16 RX ORDER — OMEPRAZOLE 20 MG/1
20 CAPSULE, DELAYED RELEASE ORAL DAILY
Qty: 90 CAP | Refills: 0 | Status: SHIPPED | OUTPATIENT
Start: 2017-12-16 | End: 2018-03-16

## 2017-12-16 RX ORDER — ATORVASTATIN CALCIUM 10 MG/1
10 TABLET, FILM COATED ORAL
Qty: 90 TAB | Refills: 0 | Status: SHIPPED | OUTPATIENT
Start: 2017-12-16 | End: 2018-03-16

## 2017-12-16 RX ORDER — ATORVASTATIN CALCIUM 10 MG/1
10 TABLET, FILM COATED ORAL
Qty: 90 TAB | Refills: 1 | Status: SHIPPED | OUTPATIENT
Start: 2017-12-16 | End: 2017-12-16

## 2017-12-16 RX ADMIN — OMEPRAZOLE 20 MG: 20 CAPSULE, DELAYED RELEASE ORAL at 09:29

## 2017-12-16 RX ADMIN — HEPARIN SODIUM 5000 UNITS: 5000 INJECTION, SOLUTION INTRAVENOUS; SUBCUTANEOUS at 05:13

## 2017-12-16 RX ADMIN — VITAMIN D, TAB 1000IU (100/BT) 2000 UNITS: 25 TAB at 09:29

## 2017-12-16 RX ADMIN — SODIUM CHLORIDE: 9 INJECTION, SOLUTION INTRAVENOUS at 05:13

## 2017-12-16 RX ADMIN — BUDESONIDE AND FORMOTEROL FUMARATE DIHYDRATE 2 PUFF: 160; 4.5 AEROSOL RESPIRATORY (INHALATION) at 09:28

## 2017-12-16 RX ADMIN — TAMSULOSIN HYDROCHLORIDE 0.4 MG: 0.4 CAPSULE ORAL at 09:29

## 2017-12-16 RX ADMIN — THERA TABS 1 TABLET: TAB at 09:29

## 2017-12-16 RX ADMIN — DOXYCYCLINE 100 MG: 100 TABLET ORAL at 09:29

## 2017-12-16 ASSESSMENT — PAIN SCALES - GENERAL
PAINLEVEL_OUTOF10: 0

## 2017-12-16 NOTE — PROGRESS NOTES
Bedside report received. Updated on pt status and POC. Pt sleeping in bed, unlabored breathing with O2 via NC in place. Bed in low/locked postion, bed alarm on. Call bell within reach. Will continue to monitor.

## 2017-12-16 NOTE — PROGRESS NOTES
Patient resting quietly at this time, call light and personal items within reach, patient had 2 soft bms during shift, continent x1, incontinent x1, has been using his urinal appropriately. Remains alert and oriented, no distress, noted, safety maintained, patient remains q2h turn

## 2017-12-16 NOTE — DISCHARGE PLANNING
Transport arranged with [facility]. Patient will be leaving today @1330 via [facility] going to [facility]. CHAIM Hunter notified.

## 2017-12-16 NOTE — DISCHARGE PLANNING
SW spoke with pt and pt's daughter. Requesting not to transfer to [facility]. Pt and family agreeable to transfer to [facility]. Pt signed [facility]. Transport packet in chart.

## 2017-12-16 NOTE — DISCHARGE PLANNING
CCS received a call from Gina at [facility] yes they can accept the patient on 12/016/2017. Sw on floor has been notified.

## 2017-12-16 NOTE — PROGRESS NOTES
Pt documented to be confused.  Pt discharged via wheelchair with Rick Akers from [facility] to [facility].  Identification checked and copy made and placed in chart.  All personal belongings with patient.  D/C instructions given and copy placed in chart. Prescription placed in packet for [facility]. Tele box and IV removed.

## 2017-12-16 NOTE — DISCHARGE INSTRUCTIONS
Discharge Instructions    Discharged to other by medical transportation with escort. Discharged via wheelchair, hospital escort: Yes.  Special equipment needed: Oxygen    Be sure to schedule a follow-up appointment with your primary care doctor or any specialists as instructed.     Discharge Plan:   Influenza Vaccine Indication: Patient Refuses    I understand that a diet low in cholesterol, fat, and sodium is recommended for good health. Unless I have been given specific instructions below for another diet, I accept this instruction as my diet prescription.   Other diet: Cardiac as tolerated    Special Instructions: None    · Is patient discharged on Warfarin / Coumadin?   No     · Is patient Post Blood Transfusion?  No    Depression / Suicide Risk    As you are discharged from this [facility] facility, it is important to learn how to keep safe from harming yourself.    Recognize the warning signs:  · Abrupt changes in personality, positive or negative- including increase in energy   · Giving away possessions  · Change in eating patterns- significant weight changes-  positive or negative  · Change in sleeping patterns- unable to sleep or sleeping all the time   · Unwillingness or inability to communicate  · Depression  · Unusual sadness, discouragement and loneliness  · Talk of wanting to die  · Neglect of personal appearance   · Rebelliousness- reckless behavior  · Withdrawal from people/activities they love  · Confusion- inability to concentrate     If you or a loved one observes any of these behaviors or has concerns about self-harm, here's what you can do:  · Talk about it- your feelings and reasons for harming yourself  · Remove any means that you might use to hurt yourself (examples: pills, rope, extension cords, firearm)  · Get professional help from the community (Mental Health, Substance Abuse, psychological counseling)  · Do not be alone:Call your Safe Contact- someone whom you trust who will be there  for you.  · Call your local CRISIS HOTLINE 255-7066 or 382-374-6140  · Call your local Children's Mobile Crisis Response Team [place] (067) 469-1978 or www.Player X  · Call the toll free National Suicide Prevention Hotlines   · National Suicide Prevention Lifeline 364-455-XBCI (4152)  · National [place] Line Network 800-SUICIDE (634-6124)      Pneumonia, Adult  Pneumonia is an infection of the lungs.   CAUSES  Pneumonia may be caused by bacteria or a virus. Usually, the infection is caused by breathing in droplets from an infected person's cough or sneeze.   SYMPTOMS   Symptoms of pneumonia include:  · Cough.  · Fever.  · Chest pain.  · Rapid breathing.  · Shortness of breath.  · Shaking chills.  · Mucus production.  DIAGNOSIS   If you have the common symptoms of pneumonia, often your health care provider will confirm the diagnosis with a chest X-ray. The X-ray will show an abnormality in the lung if you have pneumonia. Other tests may be done on your blood, urine, or mucus (sputum) to find the specific cause of your pneumonia. A blood gas test or pulse oximetry test may be needed to check how well your lungs are working.  TREATMENT   Your treatment will depend on whether your pneumonia is caused by bacteria or a virus.   · Bacterial pneumonia is treated with antibiotic medicine.  · Pneumonia that is caused by the influenza virus may be treated with an antiviral medicine.  · Pneumonia that is caused by a virus other than influenza will not respond to antibiotic medicine. This type of pneumonia will have to run its course.   HOME CARE INSTRUCTIONS   · Cough suppressants may be used if you are losing too much rest from coughing at night. However, you should try to avoid taking cough suppresants. This is because coughing helps to remove mucus from your lungs.  · Sleep in a semi-upright position at night. Try sleeping in a reclining chair, or place a few pillows under your head.  · Try using a cold steam  vaporizer or humidifier in your home or bedroom. This may help loosen your mucus.  · If you were prescribed an antibiotic medicine, finish all of it even if you start to feel better.  · If you were prescribed an expectorant, take it as directed by your health care provider. This medicine loosens the mucus so you can cough it up.  · Take medicines only as directed by your health care provider.  · Do not smoke. If you are a smoker and continue to smoke, your cough may last several weeks after your pneumonia has cleared.  · Get rest when you feel tired, or as needed.  PREVENTION  A pneumococcal shot (vaccine) is available to prevent a common bacterial cause of pneumonia. This is usually suggested for:  · People over 65 years old.  · People on chemotherapy.  · People with chronic lung problems, such as bronchitis or emphysema.  · People with immune system problems.  If you are over 65 years old or have a high risk condition, you may receive the pneumococcal vaccine if you have not received it before. In some countries, a routine influenza vaccine is also recommended. This vaccine can help prevent some cases of pneumonia. You may be offered the influenza vaccine as part of your care.  If you are a smoker, it is time to quit in order to prevent pneumonia in the future. You may receive instructions on how to stop smoking. Your health care provider can provide medicines and counseling to help you quit.  SEEK MEDICAL CARE IF:  · You have a fever.  · You cannot control your cough with suppressants at night, and you keep losing sleep.  SEEK IMMEDIATE MEDICAL CARE IF:   · You have worsening shortness of breath.  · You have increased chest pain.  · Your sickness becomes worse, especially if you are an older adult or have a weakened immune system.  · You cough up blood.  · You have pain that is getting worse or is not controlled with medicines.  · Your symptoms are getting worse rather than better.     This information is not  intended to replace advice given to you by your health care provider. Make sure you discuss any questions you have with your health care provider.     Document Released: 12/18/2006 Document Revised: 01/08/2016 Document Reviewed: 04/13/2016  Elsevier Interactive Patient Education ©2016 [employer]

## 2017-12-16 NOTE — PROGRESS NOTES
Bedside report received. Discussed pt hx, current status and POC. Pt awake, alert, sitting up in bed. Unlabored breathing on O2 via nasal canula. Pt denies pain/discomfort. No other needs. Bed in low/locked position, bed alarm on. Call bell within reach, encouraged to call with needs. Will continue to monitor.

## 2017-12-16 NOTE — DISCHARGE SUMMARY
CHIEF COMPLAINT ON ADMISSION  Chief Complaint   Patient presents with   • Hypotension     xdays, no improvement w/ fluids   • Shortness of Breath     RA O2 86%, pt able to speak in complete sentences, NAD       CODE STATUS  Full Code    HPI & HOSPITAL COURSE  83 y.o. 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 On admission patient was hypotensive, he was immediately started on sepsis protocol was provided IV fluids and IV antibiotics were started. In addition because of elevated d-dimer, as a result he underwent CTA PE which was negative for ap pulmonary embolism but did show evidence pneumonia. In addition given elevated troponin's, an echocardiogram was performed which showed Left ventricular ejection fraction is visually estimated to be 70%. cardiology was also consulted, given normal wall motions on echocardiogram, a NM stress test was completed which was negative,thus no further interventions were recommended expect for continued medical management. At this point patient has been clinically stable. Patient denies fevers/chills, chest pain, shortness of breath or nausea/vommiting.   We will continued Ceftriaoxne and doxycyline for 1 additional day in addition to today. Stop date 12/16    Therefore, he is discharged in good and stable condition for further post-acute management.     SPECIFIC OUTPATIENT FOLLOW-UP  PCP in 1-2 weeks    DISCHARGE PROBLEM LIST  Active Problems:    Failure to thrive in adult POA: Yes    COPD (chronic obstructive pulmonary disease) (CMS-[facility]) POA: Yes  Resolved Problems:    Elevated troponin POA: Yes    Pneumonia POA: Yes    Acute hypoxemic respiratory failure (CMS-[facility]) POA: Yes      FOLLOW UP  No future appointments.  No follow-up provider specified.    MEDICATIONS ON DISCHARGE   Jignesh Dumont Miramontes   Home Medication  Instructions [facility]:[id number]    Printed on:12/16/17 0941   Medication Information                      acetaminophen (TYLENOL) 325 MG Tab  Take 650 mg by mouth every 6 hours as needed.             Aclidinium Bromide 400 MCG/ACT AEROSOL POWDER, BREATH ACTIVATED  Inhale 1 Puff by mouth 2 Times a Day for 90 days.             atorvastatin (LIPITOR) 10 MG Tab  Take 1 Tab by mouth every bedtime for 90 days.             bisacodyl (DULCOLAX) 10 MG Suppos  Insert 10 mg in rectum Once.             budesonide-formoterol (SYMBICORT) 160-4.5 MCG/ACT Aerosol  Inhale 2 Puffs by mouth 2 Times a Day for 90 days.             cefTRIAXone (ROCEPHIN)  20 mL by Intravenous route every 24 hours for 2 days.             Cholecalciferol (VITAMIN D3) 1000 units Cap  Take 2,000 Units by mouth every morning.             docusate sodium (COLACE) 100 MG Cap  Take 100 mg by mouth 2 times a day.             doxycycline monohydrate (ADOXA) 100 MG tablet  Take 1 Tab by mouth every 12 hours for 2 days.             enoxaparin (LOVENOX) 40 MG/0.4ML Solution inj  Inject 40 mg as instructed every day.             fluticasone-salmeterol (ADVAIR) 250-50 MCG/DOSE AEROSOL POWDER, BREATH ACTIVATED  Inhale 1 Puff by mouth every day.             multivitamin (THERAGRAN) Tab  Take 1 Tab by mouth every morning.             omeprazole (PRILOSEC) 20 MG delayed-release capsule  Take 20 mg by mouth every day.             ondansetron (ZOFRAN ODT) 4 MG TABLET DISPERSIBLE  Take 4 mg by mouth every four hours as needed for Nausea.             oxycodone immediate-release (ROXICODONE) 5 MG Tab  Take 5 mg by mouth every four hours as needed for Severe Pain.             oxycodone immediate-release (ROXICODONE) 5 MG Tab  Take 1 Tab by mouth every four hours as needed for Severe Pain for up to 5 days.             tamsulosin (FLOMAX) 0.4 MG capsule  Take 0.4 mg by mouth every day.             tamsulosin (FLOMAX) 0.4 MG capsule  Take 1 Cap by mouth every day for 90 days.              TUDORZA PRESSAIR 400 MCG/ACT AEROSOL POWDER, BREATH ACTIVATED  Inhale 1 Puff by mouth 2 Times a Day.                 DIET  Orders Placed This Encounter   Procedures   • DIET ORDER     Standing Status:   Standing     Number of Occurrences:   1     Order Specific Question:   Diet:     Answer:   Cardiac [6]       ACTIVITY  As tolerated.    LINES, DRAINS, AND WOUNDS  This is an automated list. Peripheral IVs will be removed prior to discharge.  PIV Group Left Forearm 20g Flexible Catheter (Active)   Line Secured Taped;Transparent 12/15/2017  8:00 PM   Site Condition / Description Assessed;Patent;Clean;Dry;Intact 12/15/2017  8:00 PM   Dressing Type / Description Transparent 12/15/2017  8:00 PM   Dressing Status Observed 12/15/2017  8:00 PM   Saline Locked Yes 12/14/2017  8:00 AM   [facility] 12/15/2017  8:00 PM   Phlebitis Scale (Used by [facility]) 0 12/15/2017  8:00 PM   Date Primary Tubing Changed 12/16/17 12/15/2017  8:00 PM   NEXT Primary Tubing Change  12/16/17 12/15/2017  8:00 AM       PIV Group Right Upper Arm 18g Flexible Catheter (Active)   Line Secured Taped;Transparent 12/15/2017  8:00 PM   Site Condition / Description Patent;Clean 12/15/2017  8:00 PM   Dressing Type / Description Transparent 12/15/2017  8:00 PM   Dressing Status Observed 12/15/2017  8:00 PM   Saline Locked Yes 12/15/2017  8:00 PM   Infiltration Grading Used by [facility] 0 12/15/2017  8:00 PM   Phlebitis Scale (Used by [facility]) 0 12/15/2017  8:00 PM   Date Primary Tubing Changed 12/16/17 12/14/2017  8:00 AM       Wound Not POA Other (comment) Stage I  Buttock Left Medial (Active)   Drainage  None 12/15/2017  8:00 PM   Periwound Skin Red;Warm;Intact 12/15/2017  8:00 PM   Cleansing Normal Saline Irrigation 12/15/2017  8:00 PM   Dressing Options Foam;Mepilex 12/15/2017  8:00 PM   Dressing Status / Change Changed 12/15/2017  8:00 PM   Periwound Protectant Skin Protectant wipes to Periwound 12/15/2017  8:00  PM   Length (cm) 1.5 12/15/2017 10:00 AM   Width (cm) 1.2 12/15/2017 10:00 AM   Weekly Photo (Inpatient Only) 12/15/17 12/15/2017 10:00 AM   Dressing Change Frequency Every 72 hrs 12/15/2017  8:00 PM   Next Dressing Change  12/18/17 12/15/2017  8:00 PM   Time Spent with Patient (mins) 60 12/15/2017 10:00 AM       Wound Not POA Unstageable Buttock Left Lower (Active)   Drainage  None 12/15/2017  8:00 PM   Periwound Skin Discolored;Warm 12/15/2017  8:00 PM   Cleansing Normal Saline Irrigation 12/15/2017  8:00 PM   Dressing Options Open to Air 12/15/2017  8:00 PM   Dressing Status / Change Not Applicable 12/15/2017  8:00 PM   Length (cm) 0.4 12/15/2017 10:00 AM   Width (cm) 2 12/15/2017 10:00 AM   Weekly Photo (Inpatient Only) 12/15/17 12/15/2017 10:00 AM       Wound POA Abrasion Leg Right Medial (Active)   Drainage  None 12/15/2017  8:00 PM   Periwound Skin Excoriated;Red 12/15/2017  8:00 PM   Cleansing Approved Wound Cleanser 12/15/2017  8:00 PM   Dressing Options Mepilex 12/15/2017  8:00 PM   Dressing Status / Change Initial Dressing 12/15/2017  8:00 PM   Periwound Protectant Skin Protectant wipes to Periwound 12/15/2017 10:00 AM   Length (cm) 14 12/15/2017 10:00 AM   Width (cm) 6 12/15/2017 10:00 AM   Weekly Photo (Inpatient Only) 12/15/17 12/15/2017 10:00 AM   Dressing Change Frequency As Needed 12/15/2017  8:00 PM                  MENTAL STATUS ON TRANSFER  Level of Consciousness: Alert  Orientation : Oriented x 4  Speech: Speech Clear    CONSULTATIONS  Cardiology    PROCEDURES  None    LABORATORY  Lab Results   Component Value Date/Time    SODIUM 140 12/15/2017 02:21 AM    POTASSIUM 3.8 12/15/2017 02:21 AM    CHLORIDE 108 12/15/2017 02:21 AM    CO2 24 12/15/2017 02:21 AM    GLUCOSE 97 12/15/2017 02:21 AM    BUN 23 (H) 12/15/2017 02:21 AM    CREATININE 0.56 12/15/2017 02:21 AM        Lab Results   Component Value Date/Time    WBC 6.3 12/15/2017 02:21 AM    HEMOGLOBIN 12.7 (L) 12/15/2017 02:21 AM    HEMATOCRIT  37.2 (L) 12/15/2017 02:21 AM    PLATELETCT 316 12/15/2017 02:21 AM        Total time of the discharge process exceeds 45 minutes.

## 2017-12-17 LAB
BACTERIA BLD CULT: NORMAL
BACTERIA BLD CULT: NORMAL
SIGNIFICANT IND 70042: NORMAL
SIGNIFICANT IND 70042: NORMAL
SITE SITE: NORMAL
SITE SITE: NORMAL
SOURCE SOURCE: NORMAL
SOURCE SOURCE: NORMAL

## 2017-12-18 LAB
BACTERIA BLD CULT: NORMAL
SIGNIFICANT IND 70042: NORMAL
SITE SITE: NORMAL
SOURCE SOURCE: NORMAL

## 2017-12-20 NOTE — DOCUMENTATION QUERY
"DOCUMENTATION QUERY    PROVIDERS: Please select “Cosign w/ note”to reply to query.    To better represent the severity of illness of your patient, please review the following information and exercise your independent professional judgment in responding to this query.     Sepsis protocol is documented in the Discharge Summary. Based upon the clinical findings, risk factors, and treatment, can a diagnosis be provided to support this finding?    Please select all that apply:   • Sepsis has been ruled out  • Sepsis present on admission   • Sepsis developed after admission  • SIRS that is unrelated to any infection  • SIRS of non-infectious origin with acute organ dysfunction  • Other explanation of clinical findings  • Findings of no clinical significance  • Unable to determine    The medical record reflects the following:   Clinical Findings Per D/C Summary:   \"was admitted 12/12/2017 with acute hypoxemic respiratory failure secondary to pneumonia. On admission patient was hypotensive, he was immediately started on sepsis protocol was provided IV fluids and IV antibiotics were started.\"     Results Review:   12/12 1316: T98.2,   HR 91, BP  76/41, R16, 95% on 2L via N/C  12/12 1717: T100.9, HR 17, /63, R17, 92% on 2L via N/C  12/12 2100: T101.5, HR 92, /52, R18, 90% on 2L via N/C  12/13 0353: T99.2,   HR 74, BP  80/42, R16, 94% on 2L via N/C  12/13 0746: T100.4, HR 89, /61, R17, 92% on 2L via N/C  12/14 0400: T98.5,   HR 73, BP  95/55, R16, 92% on 2L via N/C  12/14 2313: T98.1,   HR 82, BP  93/52, R16, 94% on 2L via N/C  12/15 0710: T98.3,   HR 70, /52, R17, 93% on 2L via N/C    WBC 7.6, 7.3, 5.7, 6.3  Bands-stabs 29.4  Lactic acid 1.7/1.5  Procalcitonin 0.71, 0.18  Blood Cxs: Negative   CXR 12/12: 1.  Pulmonary vascular congestion and probable minimal edema. 2.  Mild left lung base atelectasis and probable small effusion. 3.  No definite pneumonia or pneumothorax.    Treatment IV NS 1770ml & 500ml " bolus & continuous at 50ml/hr, Rocephin, doxycycline, RT protocol, CXR, UA, & Blood Cx   Risk Factors Age, hx of polio & rheumatic disease, acute hypoxemic respiratory failure, pneumonia, COPD, & adult failure to thrive    Location within medical record History & Physical, Progress Notes, Discharge Summary, Lab Results, & Radiology Results     Thank you,   Soledad Menendez RN  Clinical   Phone [phone]

## 2017-12-30 LAB — EKG IMPRESSION: NORMAL

## 2021-01-14 DIAGNOSIS — Z23 NEED FOR VACCINATION: ICD-10-CM
